# Patient Record
Sex: MALE | Race: WHITE | NOT HISPANIC OR LATINO | Employment: OTHER | URBAN - METROPOLITAN AREA
[De-identification: names, ages, dates, MRNs, and addresses within clinical notes are randomized per-mention and may not be internally consistent; named-entity substitution may affect disease eponyms.]

---

## 2017-03-27 PROBLEM — H66.92 LOM (LEFT OTITIS MEDIA): Status: ACTIVE | Noted: 2017-03-27

## 2017-03-27 PROBLEM — I10 HYPERTENSION: Status: ACTIVE | Noted: 2017-03-27

## 2017-03-27 PROBLEM — G31.84 MINIMAL COGNITIVE IMPAIRMENT: Status: ACTIVE | Noted: 2017-03-27

## 2017-07-25 ENCOUNTER — APPOINTMENT (OUTPATIENT)
Dept: GENERAL RADIOLOGY | Facility: HOSPITAL | Age: 67
End: 2017-07-25

## 2017-07-25 ENCOUNTER — HOSPITAL ENCOUNTER (OUTPATIENT)
Facility: HOSPITAL | Age: 67
Setting detail: OBSERVATION
Discharge: HOME | End: 2017-07-27
Attending: EMERGENCY MEDICINE | Admitting: HOSPITALIST

## 2017-07-25 ENCOUNTER — APPOINTMENT (OUTPATIENT)
Dept: CT IMAGING | Facility: HOSPITAL | Age: 67
End: 2017-07-25

## 2017-07-25 DIAGNOSIS — I10 ESSENTIAL HYPERTENSION: ICD-10-CM

## 2017-07-25 DIAGNOSIS — F02.80 ALZHEIMER'S DEMENTIA WITHOUT BEHAVIORAL DISTURBANCE, UNSPECIFIED TIMING OF DEMENTIA ONSET: ICD-10-CM

## 2017-07-25 DIAGNOSIS — I20.8 ANGINA AT REST (HCC): Primary | ICD-10-CM

## 2017-07-25 DIAGNOSIS — G30.9 ALZHEIMER'S DEMENTIA WITHOUT BEHAVIORAL DISTURBANCE, UNSPECIFIED TIMING OF DEMENTIA ONSET: ICD-10-CM

## 2017-07-25 PROBLEM — R07.9 CHEST PAIN: Status: ACTIVE | Noted: 2017-07-25

## 2017-07-25 PROBLEM — E78.5 HYPERLIPIDEMIA: Status: ACTIVE | Noted: 2017-07-25

## 2017-07-25 PROBLEM — R10.9 ABDOMINAL PAIN: Status: ACTIVE | Noted: 2017-07-25

## 2017-07-25 PROBLEM — Z85.46 HISTORY OF PROSTATE CANCER: Status: ACTIVE | Noted: 2017-07-25

## 2017-07-25 LAB
ALBUMIN SERPL-MCNC: 4.6 G/DL (ref 3.2–4.8)
ALBUMIN/GLOB SERPL: 1.4 G/DL (ref 1.5–2.5)
ALP SERPL-CCNC: 135 U/L (ref 25–100)
ALT SERPL W P-5'-P-CCNC: 30 U/L (ref 7–40)
ANION GAP SERPL CALCULATED.3IONS-SCNC: 6 MMOL/L (ref 3–11)
AST SERPL-CCNC: 25 U/L (ref 0–33)
BASOPHILS # BLD AUTO: 0.01 10*3/MM3 (ref 0–0.2)
BASOPHILS NFR BLD AUTO: 0.1 % (ref 0–1)
BILIRUB SERPL-MCNC: 0.5 MG/DL (ref 0.3–1.2)
BNP SERPL-MCNC: 8 PG/ML (ref 0–100)
BUN BLD-MCNC: 25 MG/DL (ref 9–23)
BUN/CREAT SERPL: 19.2 (ref 7–25)
CALCIUM SPEC-SCNC: 9.7 MG/DL (ref 8.7–10.4)
CHLORIDE SERPL-SCNC: 104 MMOL/L (ref 99–109)
CO2 SERPL-SCNC: 28 MMOL/L (ref 20–31)
CREAT BLD-MCNC: 1.3 MG/DL (ref 0.6–1.3)
DEPRECATED RDW RBC AUTO: 47.1 FL (ref 37–54)
EOSINOPHIL # BLD AUTO: 0.2 10*3/MM3 (ref 0–0.3)
EOSINOPHIL NFR BLD AUTO: 2.1 % (ref 0–3)
ERYTHROCYTE [DISTWIDTH] IN BLOOD BY AUTOMATED COUNT: 13.2 % (ref 11.3–14.5)
GFR SERPL CREATININE-BSD FRML MDRD: 55 ML/MIN/1.73
GLOBULIN UR ELPH-MCNC: 3.3 GM/DL
GLUCOSE BLD-MCNC: 110 MG/DL (ref 70–100)
HCT VFR BLD AUTO: 42.6 % (ref 38.9–50.9)
HGB BLD-MCNC: 14.2 G/DL (ref 13.1–17.5)
HOLD SPECIMEN: NORMAL
HOLD SPECIMEN: NORMAL
IMM GRANULOCYTES # BLD: 0.09 10*3/MM3 (ref 0–0.03)
IMM GRANULOCYTES NFR BLD: 1 % (ref 0–0.6)
LIPASE SERPL-CCNC: 120 U/L (ref 6–51)
LYMPHOCYTES # BLD AUTO: 2.55 10*3/MM3 (ref 0.6–4.8)
LYMPHOCYTES NFR BLD AUTO: 27.4 % (ref 24–44)
MCH RBC QN AUTO: 32.6 PG (ref 27–31)
MCHC RBC AUTO-ENTMCNC: 33.3 G/DL (ref 32–36)
MCV RBC AUTO: 97.7 FL (ref 80–99)
MONOCYTES # BLD AUTO: 0.96 10*3/MM3 (ref 0–1)
MONOCYTES NFR BLD AUTO: 10.3 % (ref 0–12)
NEUTROPHILS # BLD AUTO: 5.51 10*3/MM3 (ref 1.5–8.3)
NEUTROPHILS NFR BLD AUTO: 59.1 % (ref 41–71)
PLATELET # BLD AUTO: 227 10*3/MM3 (ref 150–450)
PMV BLD AUTO: 9.6 FL (ref 6–12)
POTASSIUM BLD-SCNC: 4.3 MMOL/L (ref 3.5–5.5)
PROT SERPL-MCNC: 7.9 G/DL (ref 5.7–8.2)
RBC # BLD AUTO: 4.36 10*6/MM3 (ref 4.2–5.76)
SODIUM BLD-SCNC: 138 MMOL/L (ref 132–146)
TROPONIN I SERPL-MCNC: 0 NG/ML (ref 0–0.07)
WBC NRBC COR # BLD: 9.32 10*3/MM3 (ref 3.5–10.8)
WHOLE BLOOD HOLD SPECIMEN: NORMAL
WHOLE BLOOD HOLD SPECIMEN: NORMAL

## 2017-07-25 PROCEDURE — 84484 ASSAY OF TROPONIN QUANT: CPT

## 2017-07-25 PROCEDURE — 99220 PR INITIAL OBSERVATION CARE/DAY 70 MINUTES: CPT | Performed by: NURSE PRACTITIONER

## 2017-07-25 PROCEDURE — 93005 ELECTROCARDIOGRAM TRACING: CPT

## 2017-07-25 PROCEDURE — 83690 ASSAY OF LIPASE: CPT | Performed by: EMERGENCY MEDICINE

## 2017-07-25 PROCEDURE — 74176 CT ABD & PELVIS W/O CONTRAST: CPT

## 2017-07-25 PROCEDURE — 85025 COMPLETE CBC W/AUTO DIFF WBC: CPT | Performed by: EMERGENCY MEDICINE

## 2017-07-25 PROCEDURE — 83880 ASSAY OF NATRIURETIC PEPTIDE: CPT | Performed by: EMERGENCY MEDICINE

## 2017-07-25 PROCEDURE — 71010 HC CHEST PA OR AP: CPT

## 2017-07-25 PROCEDURE — 80053 COMPREHEN METABOLIC PANEL: CPT | Performed by: EMERGENCY MEDICINE

## 2017-07-25 PROCEDURE — 99284 EMERGENCY DEPT VISIT MOD MDM: CPT

## 2017-07-25 PROCEDURE — 93005 ELECTROCARDIOGRAM TRACING: CPT | Performed by: EMERGENCY MEDICINE

## 2017-07-25 RX ORDER — SODIUM CHLORIDE 0.9 % (FLUSH) 0.9 %
10 SYRINGE (ML) INJECTION AS NEEDED
Status: DISCONTINUED | OUTPATIENT
Start: 2017-07-25 | End: 2017-07-27 | Stop reason: HOSPADM

## 2017-07-25 RX ORDER — MEMANTINE HYDROCHLORIDE 10 MG/1
10 TABLET ORAL 2 TIMES DAILY
COMMUNITY

## 2017-07-25 RX ORDER — ASPIRIN 81 MG/1
81 TABLET ORAL DAILY
COMMUNITY

## 2017-07-25 RX ORDER — LISINOPRIL 40 MG/1
40 TABLET ORAL DAILY
COMMUNITY
End: 2018-02-20

## 2017-07-25 RX ORDER — AMLODIPINE BESYLATE 5 MG/1
5 TABLET ORAL DAILY
COMMUNITY
End: 2018-02-20

## 2017-07-25 RX ORDER — ASPIRIN 81 MG/1
324 TABLET, CHEWABLE ORAL ONCE
Status: COMPLETED | OUTPATIENT
Start: 2017-07-25 | End: 2017-07-25

## 2017-07-25 RX ADMIN — ASPIRIN 81 MG CHEWABLE TABLET 243 MG: 81 TABLET CHEWABLE at 22:00

## 2017-07-25 RX ADMIN — NITROGLYCERIN 0.5 INCH: 20 OINTMENT TOPICAL at 23:24

## 2017-07-26 ENCOUNTER — APPOINTMENT (OUTPATIENT)
Dept: CT IMAGING | Facility: HOSPITAL | Age: 67
End: 2017-07-26

## 2017-07-26 ENCOUNTER — APPOINTMENT (OUTPATIENT)
Dept: ULTRASOUND IMAGING | Facility: HOSPITAL | Age: 67
End: 2017-07-26

## 2017-07-26 ENCOUNTER — APPOINTMENT (OUTPATIENT)
Dept: CARDIOLOGY | Facility: HOSPITAL | Age: 67
End: 2017-07-26

## 2017-07-26 PROBLEM — I20.8 ANGINA AT REST (HCC): Status: ACTIVE | Noted: 2017-07-26

## 2017-07-26 PROBLEM — N28.9 MILD RENAL INSUFFICIENCY: Status: ACTIVE | Noted: 2017-07-26

## 2017-07-26 PROBLEM — R41.3 AMNESIA: Status: ACTIVE | Noted: 2017-07-26

## 2017-07-26 PROBLEM — R91.1 LUNG NODULE: Status: ACTIVE | Noted: 2017-07-26

## 2017-07-26 LAB
AMYLASE SERPL-CCNC: 225 U/L (ref 30–118)
ANION GAP SERPL CALCULATED.3IONS-SCNC: 7 MMOL/L (ref 3–11)
ARTICHOKE IGE QN: 65 MG/DL (ref 0–130)
BH CV STRESS BP STAGE 1: NORMAL
BH CV STRESS BP STAGE 2: NORMAL
BH CV STRESS BP STAGE 4: NORMAL
BH CV STRESS COMMENTS STAGE 1: NORMAL
BH CV STRESS DOSE REGADENOSON STAGE 1: 0.4
BH CV STRESS DURATION MIN STAGE 1: 0
BH CV STRESS DURATION MIN STAGE 2: 1
BH CV STRESS DURATION MIN STAGE 3: 1
BH CV STRESS DURATION SEC STAGE 1: 15
BH CV STRESS DURATION SEC STAGE 2: 0
BH CV STRESS HR STAGE 1: 72
BH CV STRESS HR STAGE 2: 78
BH CV STRESS HR STAGE 3: 71
BH CV STRESS HR STAGE 4: 65
BH CV STRESS PROTOCOL 1: NORMAL
BH CV STRESS RECOVERY BP: NORMAL MMHG
BH CV STRESS RECOVERY HR: 66 BPM
BH CV STRESS STAGE 1: 1
BH CV STRESS STAGE 2: 2
BH CV STRESS STAGE 3: 3
BH CV STRESS STAGE 4: 4
BUN BLD-MCNC: 26 MG/DL (ref 9–23)
BUN/CREAT SERPL: 20 (ref 7–25)
CA-I SERPL ISE-MCNC: 1.18 MMOL/L (ref 1.12–1.32)
CALCIUM SPEC-SCNC: 9.2 MG/DL (ref 8.7–10.4)
CHLORIDE SERPL-SCNC: 102 MMOL/L (ref 99–109)
CHOLEST SERPL-MCNC: 114 MG/DL (ref 0–200)
CO2 SERPL-SCNC: 27 MMOL/L (ref 20–31)
CREAT BLD-MCNC: 1.3 MG/DL (ref 0.6–1.3)
DEPRECATED RDW RBC AUTO: 48.3 FL (ref 37–54)
ERYTHROCYTE [DISTWIDTH] IN BLOOD BY AUTOMATED COUNT: 13.4 % (ref 11.3–14.5)
GFR SERPL CREATININE-BSD FRML MDRD: 55 ML/MIN/1.73
GLUCOSE BLD-MCNC: 89 MG/DL (ref 70–100)
HBA1C MFR BLD: 5.6 % (ref 4.8–5.6)
HCT VFR BLD AUTO: 40.1 % (ref 38.9–50.9)
HDLC SERPL-MCNC: 30 MG/DL (ref 40–60)
HGB BLD-MCNC: 13.2 G/DL (ref 13.1–17.5)
LIPASE SERPL-CCNC: 380 U/L (ref 6–51)
LV EF NUC BP: 72 %
MAGNESIUM SERPL-MCNC: 2.1 MG/DL (ref 1.3–2.7)
MAXIMAL PREDICTED HEART RATE: 151 BPM
MCH RBC QN AUTO: 32.5 PG (ref 27–31)
MCHC RBC AUTO-ENTMCNC: 32.9 G/DL (ref 32–36)
MCV RBC AUTO: 98.8 FL (ref 80–99)
PERCENT MAX PREDICTED HR: 54.3 %
PLATELET # BLD AUTO: 213 10*3/MM3 (ref 150–450)
PMV BLD AUTO: 10.4 FL (ref 6–12)
POTASSIUM BLD-SCNC: 4.4 MMOL/L (ref 3.5–5.5)
RBC # BLD AUTO: 4.06 10*6/MM3 (ref 4.2–5.76)
SODIUM BLD-SCNC: 136 MMOL/L (ref 132–146)
STRESS BASELINE BP: NORMAL MMHG
STRESS BASELINE HR: 51 BPM
STRESS PERCENT HR: 64 %
STRESS POST PEAK BP: NORMAL MMHG
STRESS POST PEAK HR: 82 BPM
STRESS TARGET HR: 128 BPM
TRIGL SERPL-MCNC: 148 MG/DL (ref 0–150)
TROPONIN I SERPL-MCNC: 0 NG/ML (ref 0–0.07)
TROPONIN I SERPL-MCNC: <0.006 NG/ML
TSH SERPL DL<=0.05 MIU/L-ACNC: 2.24 MIU/ML (ref 0.35–5.35)
WBC NRBC COR # BLD: 7.23 10*3/MM3 (ref 3.5–10.8)

## 2017-07-26 PROCEDURE — 71260 CT THORAX DX C+: CPT

## 2017-07-26 PROCEDURE — G0378 HOSPITAL OBSERVATION PER HR: HCPCS

## 2017-07-26 PROCEDURE — 25010000002 REGADENOSON 0.4 MG/5ML SOLUTION: Performed by: HOSPITALIST

## 2017-07-26 PROCEDURE — 93017 CV STRESS TEST TRACING ONLY: CPT

## 2017-07-26 PROCEDURE — 80061 LIPID PANEL: CPT | Performed by: HOSPITALIST

## 2017-07-26 PROCEDURE — 84484 ASSAY OF TROPONIN QUANT: CPT

## 2017-07-26 PROCEDURE — 76705 ECHO EXAM OF ABDOMEN: CPT

## 2017-07-26 PROCEDURE — 84484 ASSAY OF TROPONIN QUANT: CPT | Performed by: NURSE PRACTITIONER

## 2017-07-26 PROCEDURE — 80048 BASIC METABOLIC PNL TOTAL CA: CPT | Performed by: NURSE PRACTITIONER

## 2017-07-26 PROCEDURE — 93005 ELECTROCARDIOGRAM TRACING: CPT | Performed by: NURSE PRACTITIONER

## 2017-07-26 PROCEDURE — 85027 COMPLETE CBC AUTOMATED: CPT | Performed by: NURSE PRACTITIONER

## 2017-07-26 PROCEDURE — 83735 ASSAY OF MAGNESIUM: CPT | Performed by: NURSE PRACTITIONER

## 2017-07-26 PROCEDURE — A9555 RB82 RUBIDIUM: HCPCS | Performed by: HOSPITALIST

## 2017-07-26 PROCEDURE — 82150 ASSAY OF AMYLASE: CPT | Performed by: NURSE PRACTITIONER

## 2017-07-26 PROCEDURE — 83036 HEMOGLOBIN GLYCOSYLATED A1C: CPT | Performed by: NURSE PRACTITIONER

## 2017-07-26 PROCEDURE — 83690 ASSAY OF LIPASE: CPT | Performed by: NURSE PRACTITIONER

## 2017-07-26 PROCEDURE — 99226 PR SBSQ OBSERVATION CARE/DAY 35 MINUTES: CPT | Performed by: HOSPITALIST

## 2017-07-26 PROCEDURE — 93018 CV STRESS TEST I&R ONLY: CPT | Performed by: INTERNAL MEDICINE

## 2017-07-26 PROCEDURE — 84443 ASSAY THYROID STIM HORMONE: CPT | Performed by: NURSE PRACTITIONER

## 2017-07-26 PROCEDURE — 78492 MYOCRD IMG PET MLT RST&STRS: CPT

## 2017-07-26 PROCEDURE — 96372 THER/PROPH/DIAG INJ SC/IM: CPT

## 2017-07-26 PROCEDURE — 93010 ELECTROCARDIOGRAM REPORT: CPT | Performed by: INTERNAL MEDICINE

## 2017-07-26 PROCEDURE — 25010000002 HEPARIN (PORCINE) PER 1000 UNITS: Performed by: NURSE PRACTITIONER

## 2017-07-26 PROCEDURE — 78492 MYOCRD IMG PET MLT RST&STRS: CPT | Performed by: INTERNAL MEDICINE

## 2017-07-26 PROCEDURE — 0 IOPAMIDOL 61 % SOLUTION: Performed by: HOSPITALIST

## 2017-07-26 PROCEDURE — 0 RUBIDIUM CHLORIDE: Performed by: HOSPITALIST

## 2017-07-26 PROCEDURE — 82330 ASSAY OF CALCIUM: CPT | Performed by: NURSE PRACTITIONER

## 2017-07-26 RX ORDER — CITALOPRAM 20 MG/1
20 TABLET ORAL DAILY
Status: DISCONTINUED | OUTPATIENT
Start: 2017-07-26 | End: 2017-07-27 | Stop reason: HOSPADM

## 2017-07-26 RX ORDER — SODIUM CHLORIDE 0.9 % (FLUSH) 0.9 %
1-10 SYRINGE (ML) INJECTION AS NEEDED
Status: DISCONTINUED | OUTPATIENT
Start: 2017-07-26 | End: 2017-07-27 | Stop reason: HOSPADM

## 2017-07-26 RX ORDER — ACETAMINOPHEN 325 MG/1
650 TABLET ORAL EVERY 4 HOURS PRN
Status: DISCONTINUED | OUTPATIENT
Start: 2017-07-26 | End: 2017-07-27 | Stop reason: HOSPADM

## 2017-07-26 RX ORDER — LISINOPRIL 40 MG/1
40 TABLET ORAL DAILY
Status: DISCONTINUED | OUTPATIENT
Start: 2017-07-26 | End: 2017-07-27 | Stop reason: HOSPADM

## 2017-07-26 RX ORDER — HEPARIN SODIUM 5000 [USP'U]/ML
5000 INJECTION, SOLUTION INTRAVENOUS; SUBCUTANEOUS EVERY 12 HOURS SCHEDULED
Status: DISCONTINUED | OUTPATIENT
Start: 2017-07-26 | End: 2017-07-27 | Stop reason: HOSPADM

## 2017-07-26 RX ORDER — ASPIRIN 81 MG/1
81 TABLET ORAL DAILY
Status: DISCONTINUED | OUTPATIENT
Start: 2017-07-26 | End: 2017-07-27 | Stop reason: HOSPADM

## 2017-07-26 RX ORDER — DONEPEZIL HYDROCHLORIDE 10 MG/1
10 TABLET, FILM COATED ORAL NIGHTLY
Status: DISCONTINUED | OUTPATIENT
Start: 2017-07-26 | End: 2017-07-27 | Stop reason: HOSPADM

## 2017-07-26 RX ORDER — SODIUM CHLORIDE 9 MG/ML
125 INJECTION, SOLUTION INTRAVENOUS CONTINUOUS
Status: DISCONTINUED | OUTPATIENT
Start: 2017-07-26 | End: 2017-07-27 | Stop reason: HOSPADM

## 2017-07-26 RX ORDER — AMLODIPINE BESYLATE 5 MG/1
5 TABLET ORAL DAILY
Status: DISCONTINUED | OUTPATIENT
Start: 2017-07-26 | End: 2017-07-27 | Stop reason: HOSPADM

## 2017-07-26 RX ORDER — ATORVASTATIN CALCIUM 40 MG/1
80 TABLET, FILM COATED ORAL NIGHTLY
Status: DISCONTINUED | OUTPATIENT
Start: 2017-07-27 | End: 2017-07-27 | Stop reason: HOSPADM

## 2017-07-26 RX ORDER — MEMANTINE HYDROCHLORIDE 10 MG/1
10 TABLET ORAL 2 TIMES DAILY
Status: DISCONTINUED | OUTPATIENT
Start: 2017-07-26 | End: 2017-07-27 | Stop reason: HOSPADM

## 2017-07-26 RX ADMIN — RUBIDIUM CHLORIDE RB-82 1 DOSE: 150 INJECTION, SOLUTION INTRAVENOUS at 13:15

## 2017-07-26 RX ADMIN — SODIUM CHLORIDE 125 ML/HR: 9 INJECTION, SOLUTION INTRAVENOUS at 11:30

## 2017-07-26 RX ADMIN — ASPIRIN 81 MG: 81 TABLET, COATED ORAL at 08:01

## 2017-07-26 RX ADMIN — IOPAMIDOL 80 ML: 612 INJECTION, SOLUTION INTRAVENOUS at 14:39

## 2017-07-26 RX ADMIN — MEMANTINE 10 MG: 10 TABLET ORAL at 08:01

## 2017-07-26 RX ADMIN — MEMANTINE 10 MG: 10 TABLET ORAL at 17:16

## 2017-07-26 RX ADMIN — REGADENOSON 0.4 MG: 0.08 INJECTION, SOLUTION INTRAVENOUS at 13:14

## 2017-07-26 RX ADMIN — HEPARIN SODIUM 5000 UNITS: 5000 INJECTION, SOLUTION INTRAVENOUS; SUBCUTANEOUS at 08:01

## 2017-07-26 RX ADMIN — LISINOPRIL 40 MG: 40 TABLET ORAL at 08:01

## 2017-07-26 RX ADMIN — RUBIDIUM CHLORIDE RB-82 1 DOSE: 150 INJECTION, SOLUTION INTRAVENOUS at 13:04

## 2017-07-26 RX ADMIN — CITALOPRAM HYDROBROMIDE 20 MG: 20 TABLET ORAL at 08:01

## 2017-07-26 RX ADMIN — HEPARIN SODIUM 5000 UNITS: 5000 INJECTION, SOLUTION INTRAVENOUS; SUBCUTANEOUS at 20:30

## 2017-07-26 RX ADMIN — AMLODIPINE BESYLATE 5 MG: 5 TABLET ORAL at 08:02

## 2017-07-26 RX ADMIN — DONEPEZIL HYDROCHLORIDE 10 MG: 10 TABLET, FILM COATED ORAL at 20:30

## 2017-07-26 NOTE — ED PROVIDER NOTES
Subjective   HPI Comments: Mr. Mauricio Barber is a 67 y.o. male who has Alzheimer's and presents to the ED with c/o CP. His wife reports that they had been out running errands all day and when they got home around 1630 he began feeling lightheaded and asked if they'd eaten yet today. However, she notes that they had already eaten breakfast and lunch earlier in the day. A few hours later he began complaining of a pressure in his head and at 1930 he started complaining of a tightness in his chest. He also complains of bilateral arm tingling, and a runny nose. He denies SoA, sweating, nausea, and a sore throat. His wife states that he had a similar episode 3 months ago when he had a syncopal episode and was seen at [facility] and diagnosed with dehydration. He then wore a holter monitor that was negative and saw [facility]. He has no hx of a stress test. He has a hx of HTN, HLD, and Alzheimer's. He has a family hx of heart disease. No other acute complaints at this time.    Patient is a 67 y.o. male presenting with chest pain.   History provided by:  Patient and spouse  Chest Pain   Pain quality: tightness    Pain severity:  Moderate  Onset quality:  Gradual  Duration:  3 hours  Timing:  Constant  Progression:  Improving  Chronicity:  New  Associated symptoms: headache    Associated symptoms: no diaphoresis, no nausea and no shortness of breath        Review of Systems   Constitutional: Negative for diaphoresis.   HENT: Positive for rhinorrhea. Negative for sore throat.    Respiratory: Negative for shortness of breath.    Cardiovascular: Positive for chest pain.   Gastrointestinal: Negative for nausea.   Neurological: Positive for headaches.   All other systems reviewed and are negative.      Past Medical History:   Diagnosis Date   • Alzheimer disease    • Cancer    • Depression    • High cholesterol    • Hyperlipidemia    • Hypertension    • Prostate cancer        Allergies   Allergen Reactions   • No  Known Drug Allergy        Past Surgical History:   Procedure Laterality Date   • PROSTATE SURGERY     • PROSTATE SURGERY         Family History   Problem Relation Age of Onset   • Cancer Mother    • Heart disease Father    • No Known Problems Brother    • Hypothyroidism Daughter    • No Known Problems Sister    • Cancer Brother      Prostate       Social History     Social History   • Marital status:      Spouse name: N/A   • Number of children: N/A   • Years of education: N/A     Social History Main Topics   • Smoking status: Former Smoker   • Smokeless tobacco: None   • Alcohol use No   • Drug use: No   • Sexual activity: Yes     Partners: Female     Other Topics Concern   • None     Social History Narrative   • None         Objective   Physical Exam   Constitutional: He is oriented to person, place, and time. He appears well-developed. No distress.   HENT:   Head: Normocephalic and atraumatic.   Nose: Nose normal.   Pharynx benign. Airway patent.   Eyes: Conjunctivae are normal. No scleral icterus.   Neck: Phonation normal. Neck supple. No JVD present. No thyromegaly present.   Cardiovascular: Normal rate and regular rhythm.    Pulmonary/Chest: Effort normal. No respiratory distress. He exhibits no tenderness.   Abdominal: Soft. There is no tenderness.   Musculoskeletal: Normal range of motion. He exhibits no edema (No pretibular edema).   Lymphadenopathy:     He has no cervical adenopathy.   Neurological: He is alert and oriented to person, place, and time.   Skin: Skin is warm and dry.   Psychiatric: He has a normal mood and affect. His behavior is normal.   Nursing note and vitals reviewed.      Procedures         ED Course  ED Course   Comment By Time   He has remained comfortable here.  HEART score = 5.  Admitted as moderate risk for MACE. Abdullahi Holland MD 07/25 8598     Recent Results (from the past 24 hour(s))   Comprehensive Metabolic Panel    Collection Time: 07/25/17  9:51 PM   Result Value Ref  Range    Glucose 110 (H) 70 - 100 mg/dL    BUN 25 (H) 9 - 23 mg/dL    Creatinine 1.30 0.60 - 1.30 mg/dL    Sodium 138 132 - 146 mmol/L    Potassium 4.3 3.5 - 5.5 mmol/L    Chloride 104 99 - 109 mmol/L    CO2 28.0 20.0 - 31.0 mmol/L    Calcium 9.7 8.7 - 10.4 mg/dL    Total Protein 7.9 5.7 - 8.2 g/dL    Albumin 4.60 3.20 - 4.80 g/dL    ALT (SGPT) 30 7 - 40 U/L    AST (SGOT) 25 0 - 33 U/L    Alkaline Phosphatase 135 (H) 25 - 100 U/L    Total Bilirubin 0.5 0.3 - 1.2 mg/dL    eGFR Non African Amer 55 (L) >60 mL/min/1.73    Globulin 3.3 gm/dL    A/G Ratio 1.4 (L) 1.5 - 2.5 g/dL    BUN/Creatinine Ratio 19.2 7.0 - 25.0    Anion Gap 6.0 3.0 - 11.0 mmol/L   Lipase    Collection Time: 07/25/17  9:51 PM   Result Value Ref Range    Lipase 120 (H) 6 - 51 U/L   BNP    Collection Time: 07/25/17  9:51 PM   Result Value Ref Range    BNP 8.0 0.0 - 100.0 pg/mL   Light Blue Top    Collection Time: 07/25/17  9:51 PM   Result Value Ref Range    Extra Tube hold for add-on    Green Top (Gel)    Collection Time: 07/25/17  9:51 PM   Result Value Ref Range    Extra Tube Hold for add-ons.    Lavender Top    Collection Time: 07/25/17  9:51 PM   Result Value Ref Range    Extra Tube hold for add-on    Gold Top - SST    Collection Time: 07/25/17  9:51 PM   Result Value Ref Range    Extra Tube Hold for add-ons.    CBC Auto Differential    Collection Time: 07/25/17  9:51 PM   Result Value Ref Range    WBC 9.32 3.50 - 10.80 10*3/mm3    RBC 4.36 4.20 - 5.76 10*6/mm3    Hemoglobin 14.2 13.1 - 17.5 g/dL    Hematocrit 42.6 38.9 - 50.9 %    MCV 97.7 80.0 - 99.0 fL    MCH 32.6 (H) 27.0 - 31.0 pg    MCHC 33.3 32.0 - 36.0 g/dL    RDW 13.2 11.3 - 14.5 %    RDW-SD 47.1 37.0 - 54.0 fl    MPV 9.6 6.0 - 12.0 fL    Platelets 227 150 - 450 10*3/mm3    Neutrophil % 59.1 41.0 - 71.0 %    Lymphocyte % 27.4 24.0 - 44.0 %    Monocyte % 10.3 0.0 - 12.0 %    Eosinophil % 2.1 0.0 - 3.0 %    Basophil % 0.1 0.0 - 1.0 %    Immature Grans % 1.0 (H) 0.0 - 0.6 %    Neutrophils,  Absolute 5.51 1.50 - 8.30 10*3/mm3    Lymphocytes, Absolute 2.55 0.60 - 4.80 10*3/mm3    Monocytes, Absolute 0.96 0.00 - 1.00 10*3/mm3    Eosinophils, Absolute 0.20 0.00 - 0.30 10*3/mm3    Basophils, Absolute 0.01 0.00 - 0.20 10*3/mm3    Immature Grans, Absolute 0.09 (H) 0.00 - 0.03 10*3/mm3   POC Troponin, Rapid    Collection Time: 07/25/17  9:58 PM   Result Value Ref Range    Troponin I 0.00 0.00 - 0.07 ng/mL   POC Troponin, Rapid    Collection Time: 07/26/17 12:20 AM   Result Value Ref Range    Troponin I 0.00 0.00 - 0.07 ng/mL     Note: In addition to lab results from this visit, the labs listed above may include labs taken at another facility or during a different encounter within the last 24 hours. Please correlate lab times with ED admission and discharge times for further clarification of the services performed during this visit.    CT Abdomen Pelvis Without Contrast   Final Result   Abnormal   1.  No acute findings.   2.  5 mm right lower lobe nodule.               Fleischner Society guidelines for management of small pulmonary nodules    incidentally detected on CT:.      Nodule >4-6 mm:      Low risk: Follow-up CT at 12 months; if unchanged, no further follow-up.      High risk: Initial follow-up CT at 6-12 months, then at 18-24 months if no    change.      THIS DOCUMENT HAS BEEN ELECTRONICALLY SIGNED BY CHEVY CHO MD      XR Chest 1 View   Final Result   Abnormal   1.  No acute findings.     2.  Nodular density projecting over the right hilum may represent vessels on    end versus possible lymph node or nodule.  Comparison with previous imaging or    followup PA and lateral radiographs recommended.      THIS DOCUMENT HAS BEEN ELECTRONICALLY SIGNED BY CHEVY CHO MD        Vitals:    07/25/17 2230 07/25/17 2300 07/25/17 2330 07/26/17 0120   BP: 145/78 143/73 144/78    BP Location:       Patient Position:       Pulse: 54 55 52 59   Resp:    16   Temp:    97.9 °F (36.6 °C)   TempSrc:    Oral   SpO2:  "98% 96% 96% 95%   Weight:    201 lb 9.6 oz (91.4 kg)   Height:    72\" (182.9 cm)     Medications   sodium chloride 0.9 % flush 10 mL (not administered)   aspirin EC tablet 81 mg (not administered)   citalopram (CeleXA) tablet 20 mg (not administered)   donepezil (ARICEPT) tablet 10 mg (not administered)   memantine (NAMENDA) tablet 10 mg (not administered)   amLODIPine (NORVASC) tablet 5 mg (not administered)   lisinopril (PRINIVIL,ZESTRIL) tablet 40 mg (not administered)   sodium chloride 0.9 % flush 1-10 mL (not administered)   heparin (porcine) 5000 UNIT/ML injection 5,000 Units (not administered)   atorvastatin (LIPITOR) tablet 80 mg (not administered)   nitroglycerin (NITROSTAT) ointment 0.5 inch (not administered)   acetaminophen (TYLENOL) tablet 650 mg (not administered)   aspirin chewable tablet 324 mg (243 mg Oral Given 7/25/17 2200)   nitroglycerin (NITROSTAT) ointment 0.5 inch (0.5 inches Topical Given 7/25/17 2324)     ECG/EMG Results (last 24 hours)     Procedure Component Value Units Date/Time    ECG 12 Lead [[id number]] Collected:  07/25/17 2149     Updated:  07/25/17 2203    Narrative:       Test Reason : cp  Blood Pressure : **/** mmHG  Vent. Rate : 063 BPM     Atrial Rate : 063 BPM     P-R Int : 158 ms          QRS Dur : 078 ms      QT Int : 398 ms       P-R-T Axes : 036 019 035 degrees     QTc Int : 407 ms    Normal sinus rhythm  No previous ECGs available  Confirmed by HALLE CLAYTON MD ([id number]) on 7/25/2017 10:03:31 PM    Referred By:  EDWR           Confirmed By:HALLE CLAYTON MD                HEART Score  History: Moderately suspicious (+1)  ECG: Normal (+0)  Age: Greater than or equal to 65 (+2)  Risk Factors: 3 or more risk factors OR history of atherosclerotic disease (+2)  Troponin: Normal limit or lower (+0)  Total: 5             MDM    Final diagnoses:   Angina at rest   Alzheimer's dementia without behavioral disturbance, unspecified timing of dementia onset   Essential hypertension "       Documentation assistance provided by adelfo Ag.  Information recorded by the joseibe was done at my direction and has been verified and validated by me.     Taylor Ag  07/25/17 6228       Taylor Ag  07/25/17 6590       Abdullahi Holland MD  07/26/17 8185

## 2017-07-26 NOTE — PROGRESS NOTES
[facility] Medicine Services  INPATIENT PROGRESS NOTE    Date of Admission: 7/25/2017  Length of Stay: 0  Primary Care Physician: Thony Jamison MD    Subjective   CC: f/u chest pain   HPI:  Pt resting comfortably in bed when seen, reporting feeling hungry but no other complaints including no chest pain, dyspnea, palpitations, abdominal pain, nausea, vomiting, or diaphoresis. Pt NPO, waiting for stress test. No active or recent alcohol use. No prior hx of pancreatitis. Wife at bedside. Pt's dementia makes him a poor  of his HPI and ROS.     Review Of Systems:   Review of Systems   Constitutional: Negative for chills and fever.   Respiratory: Negative for cough and shortness of breath.    Cardiovascular: Negative for chest pain and palpitations.   Gastrointestinal: Negative for abdominal pain, nausea and vomiting.   Genitourinary: Negative for difficulty urinating.     Objective      Temp:  [97.9 °F (36.6 °C)-98.4 °F (36.9 °C)] 98 °F (36.7 °C)  Heart Rate:  [48-64] 48  Resp:  [16-20] 20  BP: (119-174)/(62-82) 119/62  Physical Exam  Constitutional: no acute distress, awake, alert  Respiratory: Clear to auscultation bilaterally, nonlabored respirations   Cardiovascular: RRR, no murmurs, rubs, or gallops, palpable pedal pulses bilaterally  Gastrointestinal: Positive bowel sounds, soft, nontender, nondistended, obese   Musculoskeletal: No bilateral ankle edema  Psychiatric: oriented x 3, appropriate affect, cooperative  Neurologic: Strength symmetric in all extremities       Results Review:    I have reviewed the labs, radiology results and diagnostic studies.      Results from last 7 days  Lab Units 07/26/17  0736   WBC 10*3/mm3 7.23   HEMOGLOBIN g/dL 13.2   PLATELETS 10*3/mm3 213       Results from last 7 days  Lab Units 07/26/17  0736   SODIUM mmol/L 136   POTASSIUM mmol/L 4.4   CHLORIDE mmol/L 102   CO2 mmol/L 27.0   BUN mg/dL 26*   CREATININE mg/dL 1.30   GLUCOSE mg/dL 89    CALCIUM mg/dL 9.2       Lab Results  Lab Value Date/Time   LIPASE 380 (H) 07/26/2017 0736   LIPASE 120 (H) 07/25/2017 2151       Troponins x 3 negative       Results from last 7 days  Lab Units 07/26/17  0736   HEMOGLOBIN A1C % 5.60           Culture Data: Cultures:         Radiology Data:   CT Abd/pelvis with no acute findings, 5mm RLL nodule. Personally reviewed, I agree with official interp of no acute abnormalities     ECG with sinus bradycardia, otherwise normal on my personal review/interp       I have reviewed the medications.    Assessment/Plan   Mr. Barber is a 67 year-old M with a past hx of alzheimer's dementia, HTN, HLP, depression, syncope, remote tobacco use (smoked multiple packs a day x 15 years, quit 35 years ago) and prostate cancer who presented to the [facility] ER on 7/25 with complaints of the sudden onset of lightheadedness, chest tightness, and bilateral arm tingling at 5PM while seated.  This is in the setting of uncontrolled HTN and some abdominal discomfort:     Problem List  Hospital Problem List     * (Principal)Chest pain    Minimal cognitive impairment    Hypertension    Hyperlipidemia    Abdominal pain    History of prostate cancer    Angina at rest    Lung nodule    Mild renal insufficiency        Assessment/Plan:  - NPO for stress test this AM to rule out unstable angina. Blood work and imaging have ruled out NSTEMI and aortic dissection, clinical suspicion for PE or Aortic Dissection is low in light of possible alternate diagnosis of mild pancreatitis   - Continue aspirin, atorvastatin  - In light of normal CT abd/pelvis, significance of elevated lipase is uncertain however it has increased and may be indicative of mild pancreatitis. Will give IVF, monitor response to diet, and check gallbladder US to eval for CBD dilation or gall stones  - F/u dedicated CT Chest, pt will need at least a repeat CT Chest in 6 months to follow up solitary pulmonary nodule   - Unknown baseline renal  function, Cr stable   - Recent holter monitor was negative   - If stress test and gallbladder us negative, and pt able to tolerate a diet, possible dc late today, otherwise tomorrow AM    DVT prophylaxis: SQ heparin  Discharge Planning: I expect patient to be discharged to home in 0-1 days.    Kumar Andrew MD   07/26/17   6:56 AM

## 2017-07-26 NOTE — H&P
[facility] Medicine Services  HISTORY AND PHYSICAL    Primary Care Physician: Thony Jamison MD    Subjective     Chief Complaint: Chest Pain    History of Present Illness:   Patient is a 67 year old male with Alzheimer's Disease, who presents to [facility] Emergency Department with complaints of chest pain. Patient is unable to give an accurate history, and his wife who is at bedside assists him. According to his wife, the patient has been complaining of non-specific abdominal pain through the day. Before dinner, the patient began to complain of headache, light-headedness, and tingling to both arms. Patient also states that he did get some mild chest pain that settled in his epigastric area. The wife reports the patient had a syncopal episode in May. He was taken to [facility] at that time and wore a holter monitor for 30 days. Tonight, while in the emergency department the patient has had negative troponin x1, no acute findings on his EKG, but has a lipase of 120. CT Abdomen/pelvis pending at time of assessment. Patient will be admitted to the hospital medicine service for further evaluation and treatment.     Review of Systems   Constitutional: Negative for chills, diaphoresis and fever.   Respiratory: Negative for cough, shortness of breath and wheezing.    Cardiovascular: Positive for chest pain. Negative for palpitations and leg swelling.   Gastrointestinal: Positive for abdominal distention and abdominal pain. Negative for nausea and vomiting.   Genitourinary: Negative for dysuria, frequency and urgency.   Musculoskeletal: Negative for arthralgias and myalgias.   Skin: Negative for color change, pallor, rash and wound.   Neurological: Positive for light-headedness and headaches. Negative for dizziness and weakness.   Psychiatric/Behavioral: Positive for confusion. Negative for agitation.   All other systems reviewed and are negative.     Otherwise complete  "ROS performed and negative except as mentioned in the HPI.    Past Medical History:   Diagnosis Date   • Alzheimer disease    • Cancer    • Depression    • High cholesterol    • Hyperlipidemia    • Hypertension    • Prostate cancer        Past Surgical History:   Procedure Laterality Date   • PROSTATE SURGERY     • PROSTATE SURGERY         Family History   Problem Relation Age of Onset   • Cancer Mother    • Heart disease Father    • No Known Problems Brother    • Hypothyroidism Daughter    • No Known Problems Sister    • Cancer Brother      Prostate       Social History     Social History   • Marital status:      Spouse name: N/A   • Number of children: N/A   • Years of education: N/A     Occupational History   • Not on file.     Social History Main Topics   • Smoking status: Former Smoker   • Smokeless tobacco: Not on file   • Alcohol use No   • Drug use: No   • Sexual activity: Yes     Partners: Female     Other Topics Concern   • Not on file     Social History Narrative   • No narrative on file       Medications:  Reviewed on Admission    Allergies:  No Known Allergies      Objective     Physical Exam:  Vital Signs: /78  Pulse 52  Temp 98.4 °F (36.9 °C) (Oral)   Resp 16  Ht 72\" (182.9 cm)  Wt 185 lb (83.9 kg)  SpO2 96%  BMI 25.09 kg/m2  Physical Exam   Constitutional: He appears well-developed and well-nourished. He is cooperative.   HENT:   Head: Normocephalic and atraumatic.   Eyes: EOM are normal. Pupils are equal, round, and reactive to light. No scleral icterus.   Neck: Normal range of motion. Neck supple. No JVD present.   Cardiovascular: Normal rate, regular rhythm, normal heart sounds and intact distal pulses.  Exam reveals no gallop and no friction rub.    No murmur heard.  Pulmonary/Chest: Effort normal and breath sounds normal. No respiratory distress. He has no wheezes. He has no rales. He exhibits no tenderness.   Abdominal: Soft. He exhibits distension. There is tenderness in " the right upper quadrant and epigastric area. There is no rebound and no guarding.   Musculoskeletal: Normal range of motion. He exhibits no edema, tenderness or deformity.   Neurological: He is alert. He has normal strength. No sensory deficit.   Skin: Skin is warm and dry. No rash noted. No erythema. No pallor.   Psychiatric: He has a normal mood and affect. His speech is normal and behavior is normal. Cognition and memory are impaired.   Nursing note and vitals reviewed.    Results Reviewed:    Results from last 7 days  Lab Units 07/25/17 2151   WBC 10*3/mm3 9.32   HEMOGLOBIN g/dL 14.2   PLATELETS 10*3/mm3 227       Results from last 7 days  Lab Units 07/25/17 2151   SODIUM mmol/L 138   POTASSIUM mmol/L 4.3   CO2 mmol/L 28.0   CREATININE mg/dL 1.30   GLUCOSE mg/dL 110*   CALCIUM mg/dL 9.7     Imaging Results (last 24 hours)     Procedure Component Value Units Date/Time    XR Chest 1 View [[id number]]  (Abnormal) Collected:  07/25/17 2151     Updated:  07/25/17 2304    Narrative:       EXAM:    XR Chest, 1 View    CLINICAL HISTORY:    67 years old, male; Pain; Chest pain; Type not specified; Additional info:   Chest pain triage protocol    TECHNIQUE:    Frontal view of the chest.    COMPARISON:    No relevant prior studies available.    FINDINGS:    Lungs: Small nodular density projecting over the right hilum. The lungs are   otherwise clear.    Pleural space:  Unremarkable.  No pneumothorax.    Heart:  Unremarkable.    Mediastinum:  Unremarkable.    Bones/joints:  Unremarkable.          Impression:       1.  No acute findings.    2.  Nodular density projecting over the right hilum may represent vessels on   end versus possible lymph node or nodule.  Comparison with previous imaging or   followup PA and lateral radiographs recommended.    THIS DOCUMENT HAS BEEN ELECTRONICALLY SIGNED BY CHEVY CHO MD    CT Abdomen Pelvis Without Contrast [[id number]] Updated:  07/26/17 0001        I have personally reviewed and  interpreted available lab data, radiology studies and ECG obtained at time of admission.     Assessment / Plan     Problem List:   Hospital Problem List     * (Principal)Chest pain    Minimal cognitive impairment    Hypertension    Hyperlipidemia    Abdominal pain    History of prostate cancer    Angina at rest    Lung nodule    Mild renal insufficiency          Assessment:  - Chest Pain  - Abdominal Pain  - ELEVATED LIPASE  - HTN- UNCONTROLLED, STARTED NORVASC TODAY/.     Plan:  - Admit to telemetry  - VS q4h  - Troponin in ED negative   - Trend tonight  - Nitro paste in ED   - Continue on admission  - ASA 324mg in ED   - Continue on admission, 81 mg  - Stress in AM, WILL KEEP NPO AFTER MIDNIGHT  - AM Labs  - +/- Cardiology inpatient consult   - Will most likely need outpatient follow up   - Continue home medications as appropriate  - NPO  - CT Abdomen/Pelvis pending at time of admission-WILL FOLLOW RESULTS.   - EKG in AM  - HEART score: 5  -HOLD DIURETICS. RECHECK RENAL FUNCTION IN AM  - GET CT OF CHEST TO EVALUATE LUNG NODULE.       DVT prophylaxis: TEDs/SCDs/Heparin SQ  Code Status: Full  Admission Status: Patient will be admitted to OBSERVATION status, however if further evaluation or treatment plans warrant, status may change.  Based upon current information, I predict patient's care encounter to be less than or equal to 2 midnights.     Franny Linares, APRN 07/25/17 11:50 PM      Brief Attending Note       I have seen and examined the patient, performing an independent face-to-face diagnostic evaluation with plan of care reviewed and developed with the advanced practice clinician (APC).      Brief Summary Statement/HPI:     Mauricio Barber is a 67 y.o. male WITH H/O HTN. DEVELOPED SUDDEN ONSET OF LIGHTED HEADEDNESS. CHEST TIGHTNESS. AND TINGLING IN BOTH ARMS WHILE SITTING IN A RECLINER AT 5 PM.  HE DENIES PREVIOUS CP. ALSO WITH C/O INCREASED BM FREQUENCY AND WALKS 45 MINUTES ON TREADMILL DAILY. HAS RECENT  UNCONTROLLED HTN AND STARTED NORVASC TODAY. CAME TO [facility] ED AFTER SX STARTED AT 5PM. INITIAL TROP WAS NEGATIVE. HE WILL BE ADMITTED FOR SERIAL TROP.     CHEST XRAY NOTED A POSSIBLE LUNG NODULE. WILL GET CHEST CT IN THE AM.       The patient's allergies, problem list, current medications, family history, past medical history, past surgical history and social history have been reviewed.     Attending Physical Exam:  Vitals reviewed-See APC noted  Gen-no acute distress  HEENT-atraumatic, normocephalic, PERRLA, EOMI, moist mucous membranes present  CV-RRR, S1 S2 normal, No Murmur, No Gallop  Resp-CTAB, no wheezes or rales; normal respiratory effort  Abd-soft, nontender, distended, normal BS.   Ext-no edema or clubbing, pedal pulses intact  Skin-warm, dry, no rashes or lesions noted  Neuro-A&Ox3, equal strength in upper and lower extremities; some mild cognitive impairment.   Psych-appropriate mood and behavior      Brief Assessment/Plan :  See above for further detailed assessment and plan developed with APC which I have reviewed and/or edited.       Asiya Cole DO  07/26/17  3:23 AM

## 2017-07-26 NOTE — PROGRESS NOTES
Discharge Planning Assessment  [facility]     Patient Name: Mauricio Barber  MRN: [id number]  Today's Date: 7/26/2017    Admit Date: 7/25/2017          Discharge Needs Assessment       07/26/17 1324    Living Environment    Lives With spouse   Pt resides in [facility] with wife Mariely     Living Arrangements house    Type of Financial/Environmental Concern none    Transportation Available car;family or friend will provide    Living Environment    Provides Primary Care For no one    Quality Of Family Relationships supportive;helpful;involved    Able to Return to Prior Living Arrangements yes    Discharge Needs Assessment    Concerns To Be Addressed no discharge needs identified;denies needs/concerns at this time    Readmission Within The Last 30 Days no previous admission in last 30 days    Anticipated Changes Related to Illness none    Equipment Currently Used at Home bipap/ cpap    Equipment Needed After Discharge none    Discharge Contact Information if Applicable [phone]            Discharge Plan       07/26/17 1328    Case Management/Social Work Plan    Plan home    Patient/Family In Agreement With Plan yes    Additional Comments Spoke with pt at bedside, pt denies discharge needs and plans to return home with his wife.        Discharge Placement     No information found        Expected Discharge Date and Time     Expected Discharge Date Expected Discharge Time    Jul 28, 2017               Demographic Summary       07/26/17 1321    Referral Information    Referral Source admission list    Contact Information    Permission Granted to Share Information With     Primary Care Physician Information    Name Thony Jamison            Functional Status       07/26/17 1321    Functional Status Current    Current Functional Level Comment see previous charting    Functional Status Prior    Ambulation 0-->independent    Transferring 0-->independent    Toileting 0-->independent    Bathing  0-->independent    Dressing 0-->independent    Eating 0-->independent    Communication 0-->understands/communicates without difficulty    Swallowing 0-->swallows foods/liquids without difficulty    IADL    Medications independent   Pt confirms he has prescription drug  coverage  and denies issues obtaining or affording medications    Meal Preparation independent    Housekeeping independent    Laundry independent    Shopping independent    Oral Care independent    Activity Tolerance    Current Activity Limitations none    Usual Activity Tolerance good    Current Activity Tolerance moderate    Employment/Financial    Financial Concerns none   pt confirms he has Medicare and [employer] and denies concerns or disruption in coverage            Psychosocial     None            Abuse/Neglect     None            Legal     None            Substance Abuse     None            Patient Forms     None          Susan Blair

## 2017-07-26 NOTE — PLAN OF CARE
Problem: Patient Care Overview (Adult)  Goal: Plan of Care Review  Outcome: Ongoing (interventions implemented as appropriate)    07/26/17 0532   Coping/Psychosocial Response Interventions   Plan Of Care Reviewed With patient   Patient Care Overview   Progress no change   Outcome Evaluation   Outcome Summary/Follow up Plan received pt from ED. no complaints of chest pain. pt NPO for stress test today.         Problem: Acute Coronary Syndrome (ACS) (Adult)  Goal: Signs and Symptoms of Listed Potential Problems Will be Absent or Manageable (Acute Coronary Syndrome)  Outcome: Ongoing (interventions implemented as appropriate)    07/26/17 0532   Acute Coronary Syndrome (ACS)   Problems Assessed (Acute Coronary Syndrome (ACS)) all   Problems Present (Acute Coronary Syndrome (ACS)) none

## 2017-07-27 VITALS
WEIGHT: 201.6 LBS | SYSTOLIC BLOOD PRESSURE: 107 MMHG | TEMPERATURE: 97.9 F | BODY MASS INDEX: 27.3 KG/M2 | HEART RATE: 52 BPM | HEIGHT: 72 IN | RESPIRATION RATE: 18 BRPM | DIASTOLIC BLOOD PRESSURE: 57 MMHG | OXYGEN SATURATION: 94 %

## 2017-07-27 PROBLEM — R19.7 DIARRHEA OF PRESUMED INFECTIOUS ORIGIN: Status: ACTIVE | Noted: 2017-07-27

## 2017-07-27 LAB
ALBUMIN SERPL-MCNC: 3.7 G/DL (ref 3.2–4.8)
ALBUMIN/GLOB SERPL: 1.4 G/DL (ref 1.5–2.5)
ALP SERPL-CCNC: 108 U/L (ref 25–100)
ALT SERPL W P-5'-P-CCNC: 28 U/L (ref 7–40)
ANION GAP SERPL CALCULATED.3IONS-SCNC: 8 MMOL/L (ref 3–11)
AST SERPL-CCNC: 21 U/L (ref 0–33)
BILIRUB SERPL-MCNC: 0.6 MG/DL (ref 0.3–1.2)
BUN BLD-MCNC: 19 MG/DL (ref 9–23)
BUN/CREAT SERPL: 19 (ref 7–25)
CALCIUM SPEC-SCNC: 8.8 MG/DL (ref 8.7–10.4)
CHLORIDE SERPL-SCNC: 106 MMOL/L (ref 99–109)
CO2 SERPL-SCNC: 22 MMOL/L (ref 20–31)
CREAT BLD-MCNC: 1 MG/DL (ref 0.6–1.3)
DEPRECATED RDW RBC AUTO: 47.4 FL (ref 37–54)
ERYTHROCYTE [DISTWIDTH] IN BLOOD BY AUTOMATED COUNT: 13.2 % (ref 11.3–14.5)
GFR SERPL CREATININE-BSD FRML MDRD: 74 ML/MIN/1.73
GLOBULIN UR ELPH-MCNC: 2.7 GM/DL
GLUCOSE BLD-MCNC: 82 MG/DL (ref 70–100)
HCT VFR BLD AUTO: 39.7 % (ref 38.9–50.9)
HGB BLD-MCNC: 12.9 G/DL (ref 13.1–17.5)
LIPASE SERPL-CCNC: 227 U/L (ref 6–51)
MCH RBC QN AUTO: 31.9 PG (ref 27–31)
MCHC RBC AUTO-ENTMCNC: 32.5 G/DL (ref 32–36)
MCV RBC AUTO: 98.3 FL (ref 80–99)
PLATELET # BLD AUTO: 185 10*3/MM3 (ref 150–450)
PMV BLD AUTO: 10.1 FL (ref 6–12)
POTASSIUM BLD-SCNC: 4.3 MMOL/L (ref 3.5–5.5)
PROT SERPL-MCNC: 6.4 G/DL (ref 5.7–8.2)
RBC # BLD AUTO: 4.04 10*6/MM3 (ref 4.2–5.76)
SODIUM BLD-SCNC: 136 MMOL/L (ref 132–146)
WBC NRBC COR # BLD: 7.04 10*3/MM3 (ref 3.5–10.8)

## 2017-07-27 PROCEDURE — 83690 ASSAY OF LIPASE: CPT | Performed by: HOSPITALIST

## 2017-07-27 PROCEDURE — 25010000002 HEPARIN (PORCINE) PER 1000 UNITS: Performed by: NURSE PRACTITIONER

## 2017-07-27 PROCEDURE — 99217 PR OBSERVATION CARE DISCHARGE MANAGEMENT: CPT | Performed by: HOSPITALIST

## 2017-07-27 PROCEDURE — G0378 HOSPITAL OBSERVATION PER HR: HCPCS

## 2017-07-27 PROCEDURE — 80053 COMPREHEN METABOLIC PANEL: CPT | Performed by: HOSPITALIST

## 2017-07-27 PROCEDURE — 96372 THER/PROPH/DIAG INJ SC/IM: CPT

## 2017-07-27 PROCEDURE — 85027 COMPLETE CBC AUTOMATED: CPT | Performed by: HOSPITALIST

## 2017-07-27 RX ADMIN — CITALOPRAM HYDROBROMIDE 20 MG: 20 TABLET ORAL at 08:23

## 2017-07-27 RX ADMIN — MEMANTINE 10 MG: 10 TABLET ORAL at 08:26

## 2017-07-27 RX ADMIN — AMLODIPINE BESYLATE 5 MG: 5 TABLET ORAL at 08:24

## 2017-07-27 RX ADMIN — HEPARIN SODIUM 5000 UNITS: 5000 INJECTION, SOLUTION INTRAVENOUS; SUBCUTANEOUS at 08:23

## 2017-07-27 RX ADMIN — LISINOPRIL 40 MG: 40 TABLET ORAL at 08:23

## 2017-07-27 RX ADMIN — ASPIRIN 81 MG: 81 TABLET, COATED ORAL at 08:23

## 2017-07-27 NOTE — PLAN OF CARE
Problem: Patient Care Overview (Adult)  Goal: Plan of Care Review  Outcome: Ongoing (interventions implemented as appropriate)    07/27/17 0436   Coping/Psychosocial Response Interventions   Plan Of Care Reviewed With patient   Patient Care Overview   Progress improving   Outcome Evaluation   Outcome Summary/Follow up Plan VSS. pt has had no c/o pain or discomfort this evening. although he has had some episodes of diarrhea.          Problem: Acute Coronary Syndrome (ACS) (Adult)  Goal: Signs and Symptoms of Listed Potential Problems Will be Absent or Manageable (Acute Coronary Syndrome)  Outcome: Ongoing (interventions implemented as appropriate)    07/27/17 0436   Acute Coronary Syndrome (ACS)   Problems Assessed (Acute Coronary Syndrome (ACS)) all   Problems Present (Acute Coronary Syndrome (ACS)) none

## 2017-07-27 NOTE — DISCHARGE SUMMARY
[facility] Hospital Medicine Services  DISCHARGE SUMMARY       Date of Admission: 7/25/2017  Date of Discharge:  7/27/2017  Primary Care Physician: Thony Jamison MD  Consulting Physician(s)          None         Discharge Diagnoses:  Active Hospital Problems (** Indicates Principal Problem)    Diagnosis Date Noted   • **Chest pain [R07.9] 07/25/2017   • Diarrhea of presumed infectious origin [A09] 07/27/2017   • Angina at rest [I20.8] 07/26/2017   • Lung nodule [R91.1] 07/26/2017   • Mild renal insufficiency [N28.9] 07/26/2017   • Hyperlipidemia [E78.5] 07/25/2017   • Abdominal pain [R10.9] 07/25/2017   • History of prostate cancer [Z85.46] 07/25/2017   • Minimal cognitive impairment [G31.84] 03/27/2017   • Hypertension [I10] 03/27/2017      Resolved Hospital Problems    Diagnosis Date Noted Date Resolved   No resolved problems to display.       Presenting Problem/History of Present Illness  Angina at rest [I20.8]     Chief Complaint on Day of Discharge: Follow-up chest pain  History of Present Illness on Day of Discharge: Patient is without recurrent episodes of chest pain.  Only complaint overnight is watery, nonbloody diarrhea that occurred 3-4 times in a 12 hour period without abdominal pain, nausea, vomiting, or fever.  No recent antibiotic exposure and no history of Clostridium difficile infection.  Patient requests discharge home.  Tolerating regular diet.      Hospital Course  Patient is a 67-year-old male with a past history of Alzheimer's dementia, hypertension, hyperlipidemia, depression, syncope with recent negative holter monitor, remote tobacco abuse (multiple packs a day X 15 years, quit 35 years ago), and prostate cancer who presented to the [facility] emergency department on 7/25 for evaluation of the abrupt onset of lightheadedness, chest tightness, and bilateral arm tingling that began on 725 at 5 PM while at rest.  Patient's initial evaluation disclosed mild  hypertension and he also reported some abdominal discomfort.  His ECGs and troponins were without evidence of acute ischemia and he was admitted to observation status for further evaluation of life-threatening causes of chest pain. NSTEMI was ruled out with serial troponins. He ultimately underwent a nuclear PET stress test that was normal and showed no evidence of active ischemia.  Patient was without hypoxia or tachycardia that would raise critical suspicion for pulmonary embolus and his chest pain was relatively self-limited.  Aortic dissection was similarly felt to be very unlikely.    During his initial evaluation patient was noted to have an incidentally found elevated serum lipase.  This was repeated on 7/26 and had increased.  Serum amylase was also elevated.  His CT of the abdomen and pelvis at admission showed no evidence of peripancreatic stranding or evidence of acute pancreatitis however, given his dementia, it is possible that his episode of chest pain as well as nausea was related to a transient mild episode of acute pancreatitis.  On 7/27 his symptoms have completely resolved and thus no further evaluation or treatment for plan for this with recommendation for continued oral diet as tolerated. Serum lipase on AM 7/27 had decreased.  Gallbladder ultrasound was performed to evaluate for possible gallstones which were seen without evidence of inflammatory changes to the gallbladder or evidence of cholecystitis. His CBD was of normal caliber at 4mm and thus, no further work-up was pursued.     Patient's initial imaging in the ER incidentally noted a 4 mm right lower lobe pulmonary nodule.  Given patient's history of tobacco abuse, he needs a repeat CT of the chest in 6 months time and then likely as subsequent repeat study thereafter to document clinical stability as per Fleischner criteria.  I defer this to his primary care physician to arrange and follow.  Patient and his spouse were counseled  extensively on this.    On day of discharge, patient did note some acute diarrhea without hematochezia or abdominal pain.  As patient was afebrile and with normal white blood cell count, further evaluation was not pursued.  He had no known risk factors for clostridium difficile colitis.  I counseled his wife on adequate oral hydration as well as supportive measures for diarrhea, probably due to mild gastroenteritis.     At time of discharge, no changes were made to patient's chronic medications.  He will continue his home antihypertensives as well as aspirin and atorvastatin.  Blood pressure was stable during his care.      Procedures Performed  ECG/EMG Results (last 7 days)     Procedure Component Value Units Date/Time    ECG 12 Lead [[id number]] Collected:  07/25/17 2149     Updated:  07/25/17 2203    Narrative:       Test Reason : cp  Blood Pressure : **/** mmHG  Vent. Rate : 063 BPM     Atrial Rate : 063 BPM     P-R Int : 158 ms          QRS Dur : 078 ms      QT Int : 398 ms       P-R-T Axes : 036 019 035 degrees     QTc Int : 407 ms    Normal sinus rhythm  No previous ECGs available  Confirmed by HALLE CLAYTON MD ([id number]) on 7/25/2017 10:03:31 PM    Referred By:  EDWR           Confirmed By:HALLE CLAYTON MD    ECG 12 Lead [[id number]] Collected:  07/26/17 0017     Updated:  07/26/17 0028    Narrative:       Test Reason : 2ND SET  Blood Pressure : **/** mmHG  Vent. Rate : 052 BPM     Atrial Rate : 052 BPM     P-R Int : 158 ms          QRS Dur : 078 ms      QT Int : 422 ms       P-R-T Axes : 043 020 046 degrees     QTc Int : 392 ms    Sinus bradycardia  Otherwise normal ECG  When compared with ECG of 25-JUL-2017 21:49,  No significant change was found  Confirmed by HALLE CLAYTON MD ([id number]) on 7/26/2017 12:28:47 AM    Referred By:  ED MD           Confirmed By:HALLE CLAYTON MD    ECG 12 Lead [[id number]] Collected:  07/26/17 0417     Updated:  07/26/17 0647    Narrative:       Test Reason : chest pain  Blood Pressure :  **/** mmHG  Vent. Rate : 051 BPM     Atrial Rate : 051 BPM     P-R Int : 162 ms          QRS Dur : 084 ms      QT Int : 452 ms       P-R-T Axes : 032 019 036 degrees     QTc Int : 416 ms    Sinus bradycardia  Otherwise normal ECG  When compared with ECG of 26-JUL-2017 00:17,  No significant change was found    Referred By:  NEFTALY           Confirmed By:         Stress Test with PET Myocardial Perfusion Imaging 7/26  Interpretation Summary      · Left ventricular ejection fraction is hyperdynamic (Calculated EF > 70%).  · Myocardial perfusion imaging indicates a normal myocardial perfusion study with no evidence of ischemia.  · Impressions are consistent with a low risk study.            Consults:   Consults     No orders found from 6/26/2017 to 7/26/2017.          Pertinent Test Results:    Results from last 7 days  Lab Units 07/27/17 0522 07/26/17  0736 07/25/17  2151   WBC 10*3/mm3 7.04 7.23 9.32   HEMOGLOBIN g/dL 12.9* 13.2 14.2   HEMATOCRIT % 39.7 40.1 42.6   PLATELETS 10*3/mm3 185 213 227         Results from last 7 days  Lab Units 07/27/17 0522 07/26/17  0736 07/25/17  2151   SODIUM mmol/L 136 136 138   POTASSIUM mmol/L 4.3 4.4 4.3   CHLORIDE mmol/L 106 102 104   CO2 mmol/L 22.0 27.0 28.0   BUN mg/dL 19 26* 25*   CREATININE mg/dL 1.00 1.30 1.30   CALCIUM mg/dL 8.8 9.2 9.7   BILIRUBIN mg/dL 0.6  --  0.5   ALK PHOS U/L 108*  --  135*   ALT (SGPT) U/L 28  --  30   AST (SGOT) U/L 21  --  25   GLUCOSE mg/dL 82 89 110*       Results from last 7 days  Lab Units 07/26/17  0736   CHOLESTEROL mg/dL 114   TRIGLYCERIDES mg/dL 148   HDL CHOL mg/dL 30*   LDL 65      Lab Results  Lab Value Date/Time   LIPASE 227 (H) 07/27/2017 0522   LIPASE 380 (H) 07/26/2017 0736   LIPASE 120 (H) 07/25/2017 2151     TSH 2.24      Results from last 7 days  Lab Units 07/26/17  0736   HEMOGLOBIN A1C % 5.60       Imaging Results (all)     Procedure Component Value Units Date/Time    XR Chest 1 View [[id number]]  (Abnormal) Collected:  07/25/17  2151     Updated:  07/25/17 2304    Narrative:       EXAM:    XR Chest, 1 View    CLINICAL HISTORY:    67 years old, male; Pain; Chest pain; Type not specified; Additional info:   Chest pain triage protocol    TECHNIQUE:    Frontal view of the chest.    COMPARISON:    No relevant prior studies available.    FINDINGS:    Lungs: Small nodular density projecting over the right hilum. The lungs are   otherwise clear.    Pleural space:  Unremarkable.  No pneumothorax.    Heart:  Unremarkable.    Mediastinum:  Unremarkable.    Bones/joints:  Unremarkable.          Impression:       1.  No acute findings.    2.  Nodular density projecting over the right hilum may represent vessels on   end versus possible lymph node or nodule.  Comparison with previous imaging or   followup PA and lateral radiographs recommended.    THIS DOCUMENT HAS BEEN ELECTRONICALLY SIGNED BY CHEVY CHO MD    CT Abdomen Pelvis Without Contrast [[id number]]  (Abnormal) Collected:  07/25/17 2339     Updated:  07/26/17 0014    Narrative:       EXAM:    CT Abdomen and Pelvis Without Intravenous Contrast    CLINICAL HISTORY:    67 years old, male; Dementia in other diseases classified elsewhere without   behavioral disturbance; Alzheimer's disease, unspecified; Essential (primary)   hypertension; Other forms of angina pectoris; Pain and signs and symptoms;   Nausea; Abdominal pain; Generalized; Prior surgery; Surgery date: 6+ months;   Surgery type: Prostate; Additional info: Abd pain    TECHNIQUE:    Axial computed tomography images of the abdomen and pelvis without   intravenous contrast.  This CT exam was performed using one or more of the   following dose reduction techniques:  automated exposure control, adjustment of   the mA and/or kV according to patient size, and/or use of iterative   reconstruction technique.    Coronal reformatted images were created and reviewed.    COMPARISON:    No relevant prior studies available.    FINDINGS:    Lower  thorax:  4-5 mm right lower lobe nodule adjacent to the minor fissure.     ABDOMEN:    Liver: Few calcified granulomas.    Gallbladder and bile ducts:  Unremarkable.    Pancreas:  Normal.    Spleen:  Calcified splenic granulomas.    Adrenals:  Normal.    Kidneys and ureters:  Punctate nonobstructive right renal stone.  Otherwise   unremarkable.    Stomach and bowel:  Unremarkable.  No obstruction.    Appendix:  No findings to suggest acute appendicitis.     PELVIS:    Bladder:  Unremarkable.    Reproductive:  Prostatectomy.     ABDOMEN and PELVIS:    Intraperitoneal space:  No free air.  No significant fluid collection.    Bones/joints:  Unremarkable. No acute fracture.    Soft tissues:  Unremarkable.    Vasculature:  Unremarkable.    Lymph nodes:  Unremarkable.  No enlarged lymph nodes.      Impression:       1.  No acute findings.  2.  5 mm right lower lobe nodule.          Fleischner Society guidelines for management of small pulmonary nodules   incidentally detected on CT:.    Nodule >4-6 mm:    Low risk: Follow-up CT at 12 months; if unchanged, no further follow-up.    High risk: Initial follow-up CT at 6-12 months, then at 18-24 months if no   change.    THIS DOCUMENT HAS BEEN ELECTRONICALLY SIGNED BY CHEVY CHO MD    US Gallbladder [[id number]] Collected:  07/27/17 1300     Updated:  07/27/17 1300    Narrative:       EXAMINATION: US GALLBLADDER-07/26/2017:     INDICATION: Evaluate for cholelithiasis or dilated CBD, here with  possible pancreatitis; I20.8-Other forms of angina pectoris;  G30.9-Alzheimer's disease, unspecified; F02.80-Dementia in other  diseases classified elsewhere without behavioral disturbance;  I10-Essential (primary) hypertension, right upper quadrant pain.     TECHNIQUE: Sonographic imaging was obtained of the right upper quadrant  in both the sagittal and transverse planes.     COMPARISON: NONE.     FINDINGS: The pancreas is homogeneous in appearance without evidence of  focal mass or  abnormal fluid collection. The liver is increased in  echogenicity suggesting diffuse fatty infiltration. No focal mass or  intrahepatic biliary ductal dilatation. The gallbladder reveals evidence  of stones, no gallbladder wall thickening or biliary ductal dilatation.  The common bile duct measures 4 mm. The right kidney is normal in size,  configuration, and texture measuring in length from pole to pole 10.9  cm. Several echogenic foci seen throughout the kidneys suggesting  nonobstructing stones. No hydronephrosis, solid cortical mass or renal  cortical cyst identified.       Impression:       1.  Gallstones with no evidence of gallbladder wall thickening or  biliary ductal dilatation.  2.  Nonobstructing stones seen within the kidneys bilaterally.     D:  07/27/2017  E:  07/27/2017              CT Chest With Contrast [[id number]] Collected:  07/27/17 1313     Updated:  07/27/17 1313    Narrative:       EXAMINATION: CT CHEST W CONTRAST-      INDICATION: Lung nodule; I20.8-Other forms of angina pectoris;  G30.9-Alzheimer's disease, unspecified; F02.80-Dementia in other  diseases classified elsewhere without behavioral disturbance;  I10-Essential (primary) hypertension.     TECHNIQUE: Spiral acquisition post IV contrast 5 mm axial images through  the chest and upper abdomen     The radiation dose reduction device was turned on for each scan per the  ALARA (As Low as Reasonably Achievable) protocol.     COMPARISON: Abdominal CT scan of earlier same date, showing 5 mm right  lower lung nodule.     FINDINGS: By my measurement, a small nodular density within the right  minor fissure measures between 4.0 and 4.5 mm in maximal dimensions on  the scan from earlier today. On this scan, nodule is very subtle, and  could easily be mistaken for a vessel seen in cross section. There is no  evidence of any other noncalcified pulmonary parenchymal nodule or other  significant pulmonary parenchymal disease. There are  "numerous  granulomatous calcifications in the right hilum, and also apparently  within peripheral right upper lobe hilar nodes. A faintly calcified node  is seen in the right middle lobe, hilar bronchovascular bundle as well.  It is overwhelmingly likely that the small nodule within the right minor  fissure is related to patient's granulomatous disease.     Mediastinal window images show no evidence of any noncalcified  adenopathy and no pericardial effusion. Thoracic aorta and pulmonary  arteries appear grossly normal. Bony structures appear grossly intact.       Impression:       Faint 4 to 5 mm nodular density within the right minor  fissure as on previous abdominal CT scan. Numerous granulomatous  calcifications of the right perihilar region. Accordingly, noncalcified  granuloma is considered most likely etiology. By lung RADS guidelines,  this nodule would be considered lung RADS category II, with followup  suggested at 12 months.     D:  07/27/2017  E:  07/27/2017             Condition on Discharge:  stable    Physical Exam on Discharge:/57 (BP Location: Left arm, Patient Position: Lying)  Pulse 52  Temp 97.9 °F (36.6 °C) (Oral)   Resp 18  Ht 72\" (182.9 cm)  Wt 201 lb 9.6 oz (91.4 kg)  SpO2 94%  BMI 27.34 kg/m2  Physical Exam  Constitutional: no acute distress, awake, alert  Respiratory: Clear to auscultation bilaterally, nonlabored respirations   Cardiovascular: RRR, no murmurs, rubs, or gallops, palpable pedal pulses bilaterally  Gastrointestinal: Positive bowel sounds, soft, nontender, nondistended, obese  Musculoskeletal: No bilateral ankle edema  Psychiatric: mildly confused, appropriate affect, cooperative  Neurologic: Strength symmetric in all extremities       Discharge Disposition  Home or Self Care    Discharge Medications   Mauricio Barber   Home Medication Instructions HANSEL:[id number]    Printed on:07/27/17 0845   Medication Information                      amLODIPine (NORVASC) 5 MG " tablet  Take 5 mg by mouth Daily.             aspirin 81 MG EC tablet  Take 81 mg by mouth Daily.             atorvastatin (LIPITOR) 10 MG tablet  Take  by mouth.             citalopram (CeleXA) 20 MG tablet  Take  by mouth.             donepezil (ARICEPT) 10 MG tablet  Take  by mouth.             lisinopril (PRINIVIL,ZESTRIL) 40 MG tablet  Take 40 mg by mouth Daily.             memantine (NAMENDA) 10 MG tablet  Take 10 mg by mouth 2 (Two) Times a Day.                 Discharge Diet:   Diet Instructions     Advance Diet As Tolerated                     Discharge Care Plan / Instructions:  - Pt needs a repeat CT Chest in 6-12 months, then at 18-24 months if no change. This needs to be arranged through PCP.       Activity at Discharge:   Activity Instructions     Activity as Tolerated                     Follow-up Appointments  No future appointments.  Additional Instructions for the Follow-ups that You Need to Schedule     Discharge Follow-up with PCP    As directed    Follow Up Details:  in 1-2 weeks                 Test Results Pending at Discharge       Kumar Andrew MD 07/27/17 8:45 AM    Time: Discharge 35 min    Please note that portions of this note may have been completed with a voice recognition program. Efforts were made to edit the dictations, but occasionally words are mistranscribed.

## 2018-01-08 ENCOUNTER — TRANSCRIBE ORDERS (OUTPATIENT)
Dept: ADMINISTRATIVE | Facility: HOSPITAL | Age: 68
End: 2018-01-08

## 2018-01-08 DIAGNOSIS — R47.89: Primary | ICD-10-CM

## 2018-01-10 ENCOUNTER — HOSPITAL ENCOUNTER (OUTPATIENT)
Dept: MRI IMAGING | Facility: HOSPITAL | Age: 68
Discharge: HOME | End: 2018-01-10
Admitting: NURSE PRACTITIONER

## 2018-01-10 DIAGNOSIS — R47.89: ICD-10-CM

## 2018-01-10 PROCEDURE — 70551 MRI BRAIN STEM W/O DYE: CPT

## 2018-02-20 ENCOUNTER — OFFICE VISIT (OUTPATIENT)
Dept: NEUROLOGY | Facility: CLINIC | Age: 68
End: 2018-02-20

## 2018-02-20 VITALS
DIASTOLIC BLOOD PRESSURE: 50 MMHG | BODY MASS INDEX: 28.58 KG/M2 | HEART RATE: 60 BPM | WEIGHT: 211 LBS | OXYGEN SATURATION: 99 % | HEIGHT: 72 IN | SYSTOLIC BLOOD PRESSURE: 124 MMHG

## 2018-02-20 DIAGNOSIS — R41.89 COGNITIVE IMPAIRMENT: Primary | ICD-10-CM

## 2018-02-20 DIAGNOSIS — C61 PROSTATE CANCER (HCC): ICD-10-CM

## 2018-02-20 DIAGNOSIS — R40.4 TRANSIENT ALTERATION OF AWARENESS: ICD-10-CM

## 2018-02-20 PROCEDURE — 99214 OFFICE O/P EST MOD 30 MIN: CPT | Performed by: NURSE PRACTITIONER

## 2018-02-20 RX ORDER — LISINOPRIL 20 MG/1
20 TABLET ORAL DAILY
COMMUNITY
End: 2018-02-20

## 2018-02-20 RX ORDER — ATORVASTATIN CALCIUM 40 MG/1
40 TABLET, FILM COATED ORAL DAILY
COMMUNITY

## 2018-02-20 RX ORDER — LISINOPRIL AND HYDROCHLOROTHIAZIDE 20; 12.5 MG/1; MG/1
TABLET ORAL
Refills: 1 | COMMUNITY
Start: 2018-01-20

## 2018-02-20 RX ORDER — BISOPROLOL FUMARATE AND HYDROCHLOROTHIAZIDE 2.5; 6.25 MG/1; MG/1
1 TABLET ORAL DAILY
COMMUNITY

## 2018-02-20 NOTE — PROGRESS NOTES
Subjective:     Patient ID: Mauricio Barber is a 68 y.o. male.    CC:   Chief Complaint   Patient presents with   • Dizziness   • Memory Loss       HPI:   History of Present Illness     Mr. Barber is a 68-year-old male returns to the clinic today with his wife.  He was last seen here in 2015 for mild cognitive impairment, at that time he was on donepezil 10 mg and doing well.  He was apparently lost to follow-up and has been seeing his primary care physician who has been prescribing his to donepazil and actually added Namenda approximately one year ago for increased memory concerns.  His wife brings him in today with complaints of 3 episodes which have been concerning to her since December.  She states that he will become suddenly weak, he describes his limbs as heavy, he has slowed speech and delayed processing during the episodes.  These may last several minutes, when he returns to baseline he feels fatigued and wants to lie down and sleep for several hours.  First episode was in December 2017, he had 2 episodes in January and none since.  His wife did check his blood pressure during the last episode which was 110/40.  He did see his PCP soon after, no blood pressure medication changes were made.  He denied any chest pain or shortness of breath during the episode, he had cardiac workup in July 2017 done at [facility] which included a stress test  Indicating a normal myocardial perfusion study with no evidence of ischemia.  This was read as a low risk for ischemia.   Wife has not witnessed any tongue biting or convulsions.  He does have urinary incontinence which is his baseline from previous prostate surgery secondary to prostate cancer.  PCP did order an MRI of the brain which was done in January and read as normal aging with no changes.  Wife reports complete blood work has been done in the last month to 2 months back from a care and normal.  Overall his wife has noted significant decline in cognition and memory in  the past couple years.  He easily recognizes people but forgets he has showered or eaten.  He does not drive.  He dresses himself and feed himself with no problems.  Denies any hallucinations.   He does exercise on a treadmill daily with no issues other than some right knee pain and left hip pain which is followed by primary care.    The following portions of the patient's history were reviewed and updated as appropriate: allergies, current medications, past family history, past medical history, past social history, past surgical history and problem list.    Past Medical History:   Diagnosis Date   • Alzheimer disease    • Cancer    • Colon polyp    • Depression    • High cholesterol    • Hyperlipidemia    • Hypertension    • Prostate cancer        Past Surgical History:   Procedure Laterality Date   • PROSTATE SURGERY     • PROSTATE SURGERY         Social History     Social History   • Marital status:      Spouse name: N/A   • Number of children: N/A   • Years of education: N/A     Occupational History   • Not on file.     Social History Main Topics   • Smoking status: Former Smoker   • Smokeless tobacco: Not on file   • Alcohol use No   • Drug use: No   • Sexual activity: Defer     Other Topics Concern   • Not on file     Social History Narrative       Family History   Problem Relation Age of Onset   • Cancer Mother    • Heart disease Father    • No Known Problems Brother    • Hypothyroidism Daughter    • No Known Problems Sister    • Cancer Brother      Prostate        Review of Systems   Constitutional: Negative for chills, fatigue, fever and unexpected weight change.   HENT: Negative.  Negative for ear pain, hearing loss, nosebleeds, rhinorrhea and sore throat.    Eyes: Positive for visual disturbance (Followed by ophthalmology, diagnosed with cataracts). Negative for photophobia, pain, discharge and itching.   Respiratory: Negative.  Negative for cough, chest tightness, shortness of breath and wheezing.     Cardiovascular: Negative.  Negative for chest pain, palpitations and leg swelling.   Gastrointestinal: Negative.  Negative for abdominal pain, blood in stool, constipation, diarrhea, nausea and vomiting.   Endocrine: Negative.    Genitourinary: Negative.  Negative for dysuria, frequency, hematuria and urgency.   Musculoskeletal: Negative for arthralgias, back pain, gait problem, joint swelling, myalgias, neck pain and neck stiffness.   Skin: Negative for rash and wound.   Allergic/Immunologic: Negative.  Negative for environmental allergies and food allergies.   Neurological: Positive for dizziness, weakness, light-headedness and numbness. Negative for tremors, seizures, syncope, speech difficulty and headaches.        All neurological symptoms are present only during episodes as noted in history of present illness.  Otherwise no complaints of weakness, dizziness, headaches or speech difficulties on a regular basis.   Hematological: Negative for adenopathy.   Psychiatric/Behavioral: Positive for confusion, decreased concentration and dysphoric mood. Negative for agitation, hallucinations, sleep disturbance and suicidal ideas.        Objective:    Neurologic Exam     Mental Status   Disoriented to person.   Disoriented to place. Disoriented to area. Oriented to country and city.   Disoriented to year and month. Oriented to date, day and season.   Registration: recalls 3 of 3 objects. Recall at 5 minutes: recalls 2 of 3 objects. Follows 3 step commands.   Speech: speech is normal   Level of consciousness: alert  Able to name object. Able to read. Able to repeat. Able to write.     Cranial Nerves     CN II   Visual fields full to confrontation.     CN III, IV, VI   Pupils are equal, round, and reactive to light.  Extraocular motions are normal.   CN III: no CN III palsy  CN VI: no CN VI palsy  Nystagmus: none   Upgaze: normal  Downgaze: normal    CN V   Facial sensation intact.     CN VII   Facial expression full,  symmetric.     CN IX, X   CN IX normal.   CN X normal.     CN XI   CN XI normal.     CN XII   CN XII normal.     Motor Exam   Muscle bulk: normal  Overall muscle tone: normal  Right arm pronator drift: absent  Left arm pronator drift: absent    Strength   Strength 5/5 throughout.     Sensory Exam   Light touch normal.       Physical Exam   Eyes: EOM are normal. Pupils are equal, round, and reactive to light.   Neurological: He has normal strength.   Psychiatric: His speech is normal.       Assessment/Plan:       Mauricio was seen today for dizziness and memory loss.    Diagnoses and all orders for this visit:    Cognitive impairment  -     EEG Awake or Drowsy Routine  -     Ambulatory Referral to Speech Therapy    Transient alteration of awareness  -     EEG Awake or Drowsy Routine    EEG ordered to rule out seizure activity.  Mr Barber has had 3 spells of vague symptoms of fatigue with transient mental slowing observed by his wife; these episodes are followed by drowsiness.  He does have cognitive impairement with MMSE 21, he will continue namenda and aricept.  He had recent labs with PCP that they report as stable.  I do recommend he hydrate well, these could be due to orthostatic BP changes and wife admits he does not drink any water.  He had negative stress test in July 2017.    They are interested in speech/cognitive therapy, I have placed order for this.  MRI from 1/2018 reviewed by Dr. Mcgregor, slightly enlarged ventricles however pt denies any shuffling gait and no changes from previous MRI 2014.   Reviewed medications, potential side effects and signs and symptoms to report. Discussed risk versus benefits of treatment plan with patient and/or family-including medications, labs and radiology that may be ordered. Addressed questions and concerns during visit. Patient and/or family verbalized understanding and agree with plan.  During this visit the following were done:  Labs Reviewed []    Labs Ordered []     Radiology Reports Reviewed [x]    Radiology Ordered []    PCP Records Reviewed []    Referring Provider Records Reviewed []    ER Records Reviewed []    Hospital Records Reviewed []    History Obtained From Family [x]    Radiology Images Reviewed [x]    Other Reviewed []    Records Requested []               Luzmaria Sosa, APRN  2/20/2018

## 2018-02-23 ENCOUNTER — HOSPITAL ENCOUNTER (OUTPATIENT)
Dept: NEUROLOGY | Facility: HOSPITAL | Age: 68
Discharge: HOME | End: 2018-02-23
Attending: NURSE PRACTITIONER | Admitting: NURSE PRACTITIONER

## 2018-02-23 PROCEDURE — 95819 EEG AWAKE AND ASLEEP: CPT

## 2018-02-23 NOTE — PROGRESS NOTES
Please let him know his EEG showed generalized slowing which we can see with age but no seizure activity.

## 2018-02-26 ENCOUNTER — TELEPHONE (OUTPATIENT)
Dept: NEUROLOGY | Facility: CLINIC | Age: 68
End: 2018-02-26

## 2018-03-06 ENCOUNTER — HOSPITAL ENCOUNTER (OUTPATIENT)
Dept: SPEECH THERAPY | Facility: HOSPITAL | Age: 68
Setting detail: THERAPIES SERIES
Discharge: HOME | End: 2018-03-06

## 2018-03-06 DIAGNOSIS — R41.3 MEMORY LOSS: ICD-10-CM

## 2018-03-06 DIAGNOSIS — R41.89 COGNITIVE IMPAIRMENT: Primary | ICD-10-CM

## 2018-03-06 PROCEDURE — G9168 MEMORY CURRENT STATUS: HCPCS

## 2018-03-06 PROCEDURE — 92523 SPEECH SOUND LANG COMPREHEN: CPT

## 2018-03-06 PROCEDURE — G9169 MEMORY GOAL STATUS: HCPCS

## 2018-03-06 NOTE — THERAPY EVALUATION
"Outpatient Speech Language Pathology   Adult Speech Language Cognitive Initial Evaluation  [facility]     Patient Name: Mauricio Barber  : 1950  MRN: [id number]  Today's Date: 3/6/2018        Visit Date: 2018   Patient Active Problem List   Diagnosis   • LOM (left otitis media)   • Minimal cognitive impairment   • Hypertension   • Chest pain   • Hyperlipidemia   • Abdominal pain   • History of prostate cancer   • Angina at rest   • Amnesia   • Lung nodule   • Mild renal insufficiency   • Diarrhea of presumed infectious origin        Past Medical History:   Diagnosis Date   • Alzheimer disease    • Cancer    • Colon polyp    • Depression    • High cholesterol    • Hyperlipidemia    • Hypertension    • Prostate cancer         Past Surgical History:   Procedure Laterality Date   • PROSTATE SURGERY     • PROSTATE SURGERY           Visit Dx:    ICD-10-CM ICD-9-CM   1. Cognitive impairment R41.89 294.9   2. Memory loss R41.3 780.93                 Adult Speech Language - 18 1400     Background and History    Reason for Referral Pt. was referred by Luzmaria Sosa for cognitive impairment  -AM    Stated Goals Pt. would like to improve memory skills.  -AM    Description of Complaint Pt. reports \"I forget a lot of things.\" He reports forgetting information someone has told him, forgetting what he is supposed to do/chores, and difficulty remembering any new information. He states that he noticed forgetfulness and difficulty thinking since post surgery for prostate cancer.   -AM    Previous Functional Status Functional in all spheres  -AM    Current Baseline Abilities Pt. is retired from working for the phone company. He takes care of their [place] acre property, no longer drives in the community, but will drive on their property some, goes out with wife, exercises on treadmill daily, and assists with household chores.   -AM    Pertinent Medications See meds list  -AM    Primary Language in the Home " English  -AM    Informant for the Evaluation Self  -AM    Expression    Expressive Language WFL  -AM    Receptive Language WFL  -AM    Cognitive Communication and Memory    Attention to be assessed in therapy  -AM    Orientation WFL except;time;place  -AM    Place Oriented with cues (comment)  -AM    Orientation Time Not oriented  -AM    Memory to be assessed in therapy  -AM    Executive Function to be assessed in therapy  -AM    Calculations to be assessed in therapy  -AM    Right Hemisphere Impairment WFL: Within Functional Limits  -AM    Oral Motor    Facial Appearance WFL  -AM    Dysarthria- Informal Assessment    Respiratory Support Effect on Speech WFL  -AM    Phonation Effects on Speech WFL  -AM    Articulation Effects on Speech WFL  -AM    Resonance Effects on Speech WFL  -AM    Prosody Effects on Speech WFL  -AM    Comprehensibility of Message    Message is Usually Comprehensible To: examiner  -AM    Standardized Tests    Cognitive/Memory Tests RBANS: Repeatable Battery for the Assessment of Neuropsychological Status  -AM    RBANS- Repeatable Battery for the Assessment of Neuropsychological Status    Immediate Memory Index Score 44  -AM    Immediate Memory Percentile 0 %  -AM    Immediate Memory Qualitative Description extremely low  -AM    Visuospatial Index Score 50  -AM    Visuospatial Percentile 0 %  -AM    Visuospatial Qualitative Description extremely low  -AM    Language Index Score 88  -AM    Language Percentile 21 %  -AM    Language Qualitative Description average;low average  -AM    Attention Index Score 60  -AM    Attention Percentile 0 %  -AM    Attention Qualitative Description extremely low  -AM    Delayed Memory Index Score 40  -AM    Delayed Memory Percentile 0 %  -AM    Delayed Memory Qualitative Description extremely low  -AM    Total Index Score 282  -AM    Total Percentile 0 %  -AM    Total Qualitative Description extremely low  -AM      User Key  (r) = Recorded By, (t) = Taken By, (c) =  Cosigned By    Initials Name Provider Type    AM Domonique Johnson CCC-SLP Speech and Language Pathologist                               OP SLP Education       03/06/18 1624    Education    Barriers to Learning Decreased comprehension  -AM    Action Taken to Address Barriers Included wife in education  -AM    Education Provided Described results of evaluation;Family/caregivers expressed understanding of evaluation;Patient participated in establishing goals and treatment plan;Family/caregivers participated in establishing goals and treatment plan  -AM    Assessed Learning motivation  -AM    Learning Motivation Strong  -AM    Learning Method Explanation;Demonstration  -AM    Teaching Response Verbalized understanding;Demonstrated understanding  -AM      User Key  (r) = Recorded By, (t) = Taken By, (c) = Cosigned By    Initials Name Effective Dates    AM Domonique Johnson CCC-SLP 04/19/17 -                 SLP OP Goals       03/06/18 1400       Goal Type Needed    Goal Type Needed Other Adult Goals  -AM     Subjective Comments    Subjective Comments Pt. reports that he is feeling well. No complaints this date.  -AM     Subjective Pain    Able to rate subjective pain? yes  -AM     Pre-Treatment Pain Level 0  -AM     Post-Treatment Pain Level 0  -AM     Other Goals    Other Adult Goal- 1 LTG 1: Pt. will demonstrate improvment in cognitive communication skills with use of compensatory strategies.  -AM     Status: Other Adult Goal- 1 New  -AM     Other Adult Goal- 2 STG 1: Pt. will immediately recall/repeat 5 unrelated words with 80% accuracy and one repetition PRN.   -AM     Status: Other Adult Goal- 2 New  -AM     Other Adult Goal- 3 STG 2: Pt. will recall 3 related words post 3 min with 80% accuracy and category cues.  -AM     Status: Other Adult Goal- 3 New  -AM     Other Adult Goal- 4 STG 3: Pt. will recall details from 36-50 wd. paragraphs with 80% accuracy and intermittent cues.   -AM     Status: Other Adult Goal- 4  New  -AM     Other Adult Goal- 5 STG 4: Pt. will complete divergent naming tasks (15 items/concrete category/1 min) with 80% accuracy and intermittent cues.   -AM     Status: Other Adult Goal- 5 New  -AM     Other Adult Goal- 6 STG 5: Pt. will complete ongoing assessment of organization, reasoning, and executive functioning.   -AM     Status: Other Adult Goal- 6 New  -AM     SLP Time Calculation    SLP Goal Re-Cert Due Date 06/04/18  -AM       User Key  (r) = Recorded By, (t) = Taken By, (c) = Cosigned By    Initials Name Provider Type    AM Domonique Johnson CCC-SLP Speech and Language Pathologist                OP SLP Assessment/Plan - 03/06/18 1620     SLP Assessment    Functional Problems Speech Language- Adult/Cognition  -AM    Impact on Function: Adult Speech Language/Cognition Trouble learning or remembering new information;Difficulty participating in avocational activities;Decreased recall of personal information and medical history;Difficulty sequencing thoughts to express complex messages  -AM    Clinical Impression: Speech Language-Adult/Congnition Severe:;Cognitive Communication Impairment  -AM    Clinical Impression Comments Pt. presents with severe cognitive communication deficits in the areas of memory, attention, and visuospatial skills. Ongoing evaluation of reasoning, organization, and executive functioning is recommended. Pt. will benefit from skilled SLP services.   -AM    Please refer to paper survey for additional self-reported information Yes  -AM    Please refer to items scanned into chart for additional diagnostic informaiton and handouts as provided by clinician Yes  -AM    Prognosis Good (comment)  -AM    Patient/caregiver participated in establishment of treatment plan and goals Yes  -AM    Patient would benefit from skilled therapy intervention Yes  -AM    SLP Plan    Frequency 1x/weekly  -AM    Duration 12 weeks  -AM    Planned CPT's? SLP INDIVIDUAL SPEECH THERAPY: 31486;SLP OLINDA COG  SKILLS (PER 15 MINUTES): 20451  -AM    Expected Duration Therapy Session (min) 45-60 minutes  -AM    Plan Comments Continue with evaluation. Initiate STGs  -AM      User Key  (r) = Recorded By, (t) = Taken By, (c) = Cosigned By    Initials Name Provider Type    AM VANESSA Bill Speech and Language Pathologist             SLP Outcome Measures (last 72 hours)      SLP Outcome Measures       03/06/18 1400          SLP Outcome Measures    Outcome Measure Used? Adult NOMS  -AM      FCM Scores    Memory FCM Score 3  -AM        User Key  (r) = Recorded By, (t) = Taken By, (c) = Cosigned By    Initials Name Effective Dates    AM VANESSA Bill 04/19/17 -              Time Calculation:   SLP Start Time: 1400    Therapy Charges for Today     Code Description Service Date Service Provider Modifiers Qty    97679713734 HC ST MEMORY CURRENT 3/6/2018 VANESSA Bill GN, CM 1    23889120378 HC ST MEMORY PROJECTED 3/6/2018 VANESSA Bill GN, CK 1    24474971056 HC ST EVAL SPEECH AND PROD W LANG  6 3/6/2018 VANESSA Bill GN 1          SLP G-Codes  SLP NOMS Used?: Yes  Functional Limitations: Memory  Memory Current Status (): At least 80 percent but less than 100 percent impaired, limited or restricted  Memory Goal Status (): At least 40 percent but less than 60 percent impaired, limited or restricted        VANESSA Bill  3/6/2018

## 2018-03-12 ENCOUNTER — APPOINTMENT (OUTPATIENT)
Dept: SPEECH THERAPY | Facility: HOSPITAL | Age: 68
End: 2018-03-12

## 2018-03-13 ENCOUNTER — HOSPITAL ENCOUNTER (OUTPATIENT)
Dept: SPEECH THERAPY | Facility: HOSPITAL | Age: 68
Setting detail: THERAPIES SERIES
Discharge: HOME | End: 2018-03-13

## 2018-03-13 DIAGNOSIS — R41.89 COGNITIVE IMPAIRMENT: Primary | ICD-10-CM

## 2018-03-13 DIAGNOSIS — R41.3 MEMORY LOSS: ICD-10-CM

## 2018-03-13 PROCEDURE — 92507 TX SP LANG VOICE COMM INDIV: CPT

## 2018-03-13 NOTE — THERAPY TREATMENT NOTE
Outpatient Speech Language Pathology   Adult Speech Language Cognitive Treatment Note  [facility]     Patient Name: Mauricio Barber  : 1950  MRN: [id number]  Today's Date: 3/13/2018         Visit Date: 2018   Patient Active Problem List   Diagnosis   • LOM (left otitis media)   • Minimal cognitive impairment   • Hypertension   • Chest pain   • Hyperlipidemia   • Abdominal pain   • History of prostate cancer   • Angina at rest   • Amnesia   • Lung nodule   • Mild renal insufficiency   • Diarrhea of presumed infectious origin          Visit Dx:    ICD-10-CM ICD-9-CM   1. Cognitive impairment R41.89 294.9   2. Memory loss R41.3 780.93             SLP SLC Evaluation - 18 1300        Cognitive Assessment Intervention- SLP    Thought Organization (Cognitive) abstract divergent;mild impairment;abstract convergent;mental manipulation;WNL  -AM    Reasoning (Cognitive) simple;mod-complex;deductive;mental flexibility;WFL;complex;moderate impairment;severe impairment  -AM    Problem Solving (Cognitive) temporal;moderate impairment;severe impairment;multifactorial;WFL  -AM    Executive Function (Cognition) planning;complex organization;severe impairment  -AM      User Key  (r) = Recorded By, (t) = Taken By, (c) = Cosigned By    Initials Name Provider Type    AM Domonique Johnson CCC-SLP Speech and Language Pathologist                              SLP OP Goals     Row Name 18 1300          Subjective Comments    Subjective Comments Pt. reports that he is feeling well, but is noticing ringing in his ears today. Tinnitus has been present since he was in [place] and severity goes up and down.   -AM        Subjective Pain    Able to rate subjective pain? yes  -AM     Pre-Treatment Pain Level 0  -AM     Post-Treatment Pain Level 0  -AM        Other Goals    Other Adult Goal- 1 LTG 1: Pt. will demonstrate improvment in cognitive communication skills with use of compensatory strategies.  -AM     Status: Other  Adult Goal- 1 Progressing as expected  -AM     Comments: Other Adult Goal- 1 3/13: Completed initial evaluation for organization, reasoning, and executive functioning. New STGs added.   -AM     Other Adult Goal- 2 STG 1: Pt. will immediately recall/repeat 5 unrelated words with 80% accuracy and one repetition PRN.   -AM     Status: Other Adult Goal- 2 New  -AM     Other Adult Goal- 3 STG 2: Pt. will recall 3 related words post 3 min with 80% accuracy and category cues.  -AM     Status: Other Adult Goal- 3 New  -AM     Other Adult Goal- 4 STG 3: Pt. will recall details from 36-50 wd. paragraphs with 80% accuracy and intermittent cues.   -AM     Status: Other Adult Goal- 4 New  -AM     Other Adult Goal- 5 STG 4: Pt. will complete divergent naming tasks (15 items/concrete category/1 min) with 80% accuracy and intermittent cues.   -AM     Status: Other Adult Goal- 5 Progressing as expected  -AM     Comments: Other Adult Goal- 5 3/13: Divergent naming: 15 items/concrete/1 min: 100% with intermittent cues.   -AM     Other Adult Goal- 6 STG 5: Pt. will complete ongoing assessment of organization, reasoning, and executive functioning.   -AM     Status: Other Adult Goal- 6 Achieved  -AM     Comments: Other Adult Goal- 6 3/13: COMPLETE and STGs added PRN.  -AM     Other Adult Goal- 7 STG 6: Pt. will complete temporal problems with picture with 80% accuracy and intermittent cues.  -AM     Status: Other Adult Goal- 7 New  -AM     Comments: Other Adult Goal- 7 3/13: New goal added.  -AM     Other Adult Goal- 8 STG 7: Pt. will complete planning tasks with 80% accuracy and intermittent cues.   -AM     Status: Other Adult Goal- 8 New  -AM     Comments: Other Adult Goal- 8 3/13: STG added  -AM        SLP Time Calculation    SLP Goal Re-Cert Due Date 06/04/18  -AM       User Key  (r) = Recorded By, (t) = Taken By, (c) = Cosigned By    Initials Name Provider Type    AM Domonique Johnson CCC-SLP Speech and Language Pathologist                 OP SLP Education     Row Name 03/13/18 1345       Education    Barriers to Learning Hearing deficit  -AM    Education Provided Patient demonstrated recommended strategies;Patient requires further education on strategies, risks  -AM    Assessed Learning motivation  -AM    Learning Motivation Strong  -AM    Learning Method Explanation;Demonstration  -AM    Teaching Response Verbalized understanding;Demonstrated understanding  -AM      User Key  (r) = Recorded By, (t) = Taken By, (c) = Cosigned By    Initials Name Effective Dates    AM VANESSA Bill 04/19/17 -                 OP SLP Assessment/Plan - 03/13/18 1345        SLP Plan    Plan Comments Continue with POC  -AM      User Key  (r) = Recorded By, (t) = Taken By, (c) = Cosigned By    Initials Name Provider Type    AM DAVID BillSLP Speech and Language Pathologist                 Time Calculation:   SLP Start Time: 1300    Therapy Charges for Today     Code Description Service Date Service Provider Modifiers Qty    97152303316  ST TREATMENT SPEECH 4 3/13/2018 DAVID BillSLP GN 1                   VANESSA Bill  3/13/2018

## 2018-03-26 ENCOUNTER — HOSPITAL ENCOUNTER (OUTPATIENT)
Dept: SPEECH THERAPY | Facility: HOSPITAL | Age: 68
Setting detail: THERAPIES SERIES
Discharge: HOME | End: 2018-03-26

## 2018-03-26 DIAGNOSIS — R41.3 MEMORY LOSS: Primary | ICD-10-CM

## 2018-03-26 DIAGNOSIS — R41.89 COGNITIVE IMPAIRMENT: ICD-10-CM

## 2018-03-26 PROCEDURE — 92507 TX SP LANG VOICE COMM INDIV: CPT

## 2018-03-26 NOTE — THERAPY TREATMENT NOTE
Outpatient Speech Language Pathology   Adult Speech Language Cognitive Treatment Note  [facility]     Patient Name: Mauricio Barber  : 1950  MRN: [id number]  Today's Date: 3/26/2018         Visit Date: 2018   Patient Active Problem List   Diagnosis   • LOM (left otitis media)   • Minimal cognitive impairment   • Hypertension   • Chest pain   • Hyperlipidemia   • Abdominal pain   • History of prostate cancer   • Angina at rest   • Amnesia   • Lung nodule   • Mild renal insufficiency   • Diarrhea of presumed infectious origin          Visit Dx:    ICD-10-CM ICD-9-CM   1. Memory loss R41.3 780.93   2. Cognitive impairment R41.89 294.9                               SLP OP Goals     Row Name 18 1300          Subjective Comments    Subjective Comments Pt. reports that he is feeling well. No complaints this date.   -AM        Subjective Pain    Able to rate subjective pain? yes  -AM     Pre-Treatment Pain Level 0  -AM     Post-Treatment Pain Level 0  -AM        Other Goals    Other Adult Goal- 1 LTG 1: Pt. will demonstrate improvment in cognitive communication skills with use of compensatory strategies.  -AM     Status: Other Adult Goal- 1 Progressing as expected  -AM     Comments: Other Adult Goal- 1 3/13: Completed initial evaluation for organization, reasoning, and executive functioning. New STGs added.   -AM     Other Adult Goal- 2 STG 1: Pt. will immediately recall/repeat 4 unrelated words with 80% accuracy and one repetition PRN.   -AM     Status: Other Adult Goal- 2 Progress slower than expected  -AM     Comments: Other Adult Goal- 2 3/26: Revised goal from 5 unrelated words to 4 unrelated words. Immediate recall of 5 unrelated words: 0% with repetitions and cues. Immediate recall of 4 unrelated words: 50% with intermittent cues.   -AM     Other Adult Goal- 3 STG 2: Pt. will recall 3 related words post 3 min with 80% accuracy and category cues.  -AM     Status: Other Adult Goal- 3 Progressing as  expected  -AM     Comments: Other Adult Goal- 3 3/26: Recall 3 related words post 3 min: 67% with intermittent cues.   -AM     Other Adult Goal- 4 STG 3: Pt. will recall details from 36-50 wd. paragraphs with 80% accuracy and intermittent cues.   -AM     Status: Other Adult Goal- 4 Progressing as expected  -AM     Comments: Other Adult Goal- 4 3/26: Recall details from 36-50 wd. paragraphs: 58% with intermittent cues.  -AM     Other Adult Goal- 5 STG 4: Pt. will complete divergent naming tasks (15 items/concrete category/1 min) with 80% accuracy and intermittent cues.   -AM     Status: Other Adult Goal- 5 Progressing as expected  -AM     Comments: Other Adult Goal- 5 3/13: Divergent naming: 15 items/concrete/1 min: 100% with intermittent cues.   -AM     Other Adult Goal- 6 STG 5: Pt. will complete ongoing assessment of organization, reasoning, and executive functioning.   -AM     Status: Other Adult Goal- 6 Achieved  -AM     Comments: Other Adult Goal- 6 3/13: COMPLETE and STGs added PRN.  -AM     Other Adult Goal- 7 STG 6: Pt. will complete temporal problems with picture with 80% accuracy and intermittent cues.  -AM     Status: Other Adult Goal- 7 New  -AM     Comments: Other Adult Goal- 7 3/26: Temporal problems with pictures: 60% with intermittent cues. Required extended time to complete each question.   -AM     Other Adult Goal- 8 STG 7: Pt. will complete planning tasks with 80% accuracy and intermittent cues.   -AM     Status: Other Adult Goal- 8 New  -AM     Comments: Other Adult Goal- 8 3/13: STG added  -AM        SLP Time Calculation    SLP Goal Re-Cert Due Date 06/04/18  -AM       User Key  (r) = Recorded By, (t) = Taken By, (c) = Cosigned By    Initials Name Provider Type    AM Domonique Johnson CCC-SLP Speech and Language Pathologist                OP SLP Education     Row Name 03/26/18 1300       Education    Barriers to Learning Hearing deficit  -AM    Education Provided Patient demonstrated recommended  strategies;Patient requires further education on strategies, risks  -AM    Assessed Learning motivation  -AM    Learning Motivation Moderate;Strong  -AM    Learning Method Explanation;Demonstration  -AM    Teaching Response Verbalized understanding;Demonstrated understanding  -AM      User Key  (r) = Recorded By, (t) = Taken By, (c) = Cosigned By    Initials Name Effective Dates    AM VANESSA Bill 04/19/17 -                 OP SLP Assessment/Plan - 03/26/18 1300        SLP Plan    Plan Comments Continue with POC  -AM      User Key  (r) = Recorded By, (t) = Taken By, (c) = Cosigned By    Initials Name Provider Type    AM DAVID BillSLP Speech and Language Pathologist                 Time Calculation:   SLP Start Time: 1300    Therapy Charges for Today     Code Description Service Date Service Provider Modifiers Qty    [id number]  ST TREATMENT SPEECH 4 3/26/2018 Domonique Johnson CCC-SLP GN 1                   VANESSA Bill  3/26/2018

## 2018-04-02 ENCOUNTER — APPOINTMENT (OUTPATIENT)
Dept: SPEECH THERAPY | Facility: HOSPITAL | Age: 68
End: 2018-04-02

## 2018-04-03 ENCOUNTER — HOSPITAL ENCOUNTER (OUTPATIENT)
Dept: SPEECH THERAPY | Facility: HOSPITAL | Age: 68
Setting detail: THERAPIES SERIES
Discharge: HOME | End: 2018-04-03

## 2018-04-03 DIAGNOSIS — R41.3 MEMORY LOSS: Primary | ICD-10-CM

## 2018-04-03 DIAGNOSIS — R41.89 COGNITIVE IMPAIRMENT: ICD-10-CM

## 2018-04-03 PROCEDURE — G0515 COGNITIVE SKILLS DEVELOPMENT: HCPCS

## 2018-04-03 PROCEDURE — G9168 MEMORY CURRENT STATUS: HCPCS

## 2018-04-03 PROCEDURE — G9169 MEMORY GOAL STATUS: HCPCS

## 2018-04-05 ENCOUNTER — TELEPHONE (OUTPATIENT)
Dept: NEUROLOGY | Facility: CLINIC | Age: 68
End: 2018-04-05

## 2018-04-05 NOTE — TELEPHONE ENCOUNTER
Mauricio's wife was called for jury duty.  Mariely states that there is way that she can serve because she can't leave him.   Will you give her a letter excusing her?

## 2018-04-09 ENCOUNTER — HOSPITAL ENCOUNTER (OUTPATIENT)
Dept: SPEECH THERAPY | Facility: HOSPITAL | Age: 68
Setting detail: THERAPIES SERIES
Discharge: HOME | End: 2018-04-09

## 2018-04-09 ENCOUNTER — TELEPHONE (OUTPATIENT)
Dept: NEUROLOGY | Facility: CLINIC | Age: 68
End: 2018-04-09

## 2018-04-09 DIAGNOSIS — R41.3 MEMORY LOSS: Primary | ICD-10-CM

## 2018-04-09 DIAGNOSIS — R41.89 COGNITIVE IMPAIRMENT: ICD-10-CM

## 2018-04-09 PROCEDURE — G0515 COGNITIVE SKILLS DEVELOPMENT: HCPCS

## 2018-04-09 NOTE — TELEPHONE ENCOUNTER
Recommend she speak with PCP about this as they have been seeing/treating him for his cognitive impairement for years

## 2018-04-09 NOTE — THERAPY TREATMENT NOTE
Outpatient Speech Language Pathology   Adult Speech Language Cognitive Treatment Note  [facility]     Patient Name: Mauricio Barber  : 1950  MRN: [id number]  Today's Date: 2018         Visit Date: 2018   Patient Active Problem List   Diagnosis   • LOM (left otitis media)   • Minimal cognitive impairment   • Hypertension   • Chest pain   • Hyperlipidemia   • Abdominal pain   • History of prostate cancer   • Angina at rest   • Amnesia   • Lung nodule   • Mild renal insufficiency   • Diarrhea of presumed infectious origin          Visit Dx:    ICD-10-CM ICD-9-CM   1. Memory loss R41.3 780.93   2. Cognitive impairment R41.89 294.9                               SLP OP Goals     Row Name 18 1300          Subjective Comments    Subjective Comments Pt. reports that he is feeling alright.   -AM        Subjective Pain    Able to rate subjective pain? yes  -AM     Pre-Treatment Pain Level 0  -AM     Post-Treatment Pain Level 0  -AM        Other Goals    Other Adult Goal- 1 LTG 1: Pt. will demonstrate improvment in cognitive communication skills with use of compensatory strategies.  -AM     Status: Other Adult Goal- 1 Progressing as expected  -AM     Comments: Other Adult Goal- 1 3/13: Completed initial evaluation for organization, reasoning, and executive functioning. New STGs added.   -AM     Other Adult Goal- 2 STG 1: Pt. will immediately recall/repeat 4 unrelated words with 80% accuracy and one repetition PRN.   -AM     Status: Other Adult Goal- 2 Progress slower than expected  -AM     Comments: Other Adult Goal- 2 4/3: Immediate recall of 4 unrelated words: 80% with intermittent cues. 3/26: Revised goal from 5 unrelated words to 4 unrelated words. Immediate recall of 5 unrelated words: 0% with repetitions and cues. Immediate recall of 4 unrelated words: 50% with intermittent cues.   -AM     Other Adult Goal- 3 STG 2: Pt. will recall 3 related words post 3 min with 80% accuracy and category cues.   -AM     Status: Other Adult Goal- 3 Progressing as expected  -AM     Comments: Other Adult Goal- 3 4/9: Recall 3 related words post 3 min: 67% with intermittent cues. 3/26: Recall 3 related words post 3 min: 67% with intermittent cues.   -AM     Other Adult Goal- 4 STG 3: Pt. will recall details from 36-50 wd. paragraphs with 80% accuracy and intermittent cues.   -AM     Status: Other Adult Goal- 4 Progressing as expected  -AM     Comments: Other Adult Goal- 4 4/9: Recall details from 1 min news: 54% with cues. 3/26: Recall details from 36-50 wd. paragraphs: 58% with intermittent cues.  -AM     Other Adult Goal- 5 STG 4: Pt. will complete divergent naming tasks (15 items/concrete category/1 min) with 80% accuracy and intermittent cues.   -AM     Status: Other Adult Goal- 5 Progressing as expected  -AM     Comments: Other Adult Goal- 5 4/9: Divergent naming: 15 items/concrete/1 min: 50% with intermittent cues. Perseverations on same items already listed. 4/3: Divergent naming: 15 items/concrete/1 min: 73% with intermittent cues. Easily distracted by personal stories. 3/13: Divergent naming: 15 items/concrete/1 min: 100% with intermittent cues.   -AM     Other Adult Goal- 6 STG 5: Pt. will complete ongoing assessment of organization, reasoning, and executive functioning.   -AM     Status: Other Adult Goal- 6 Achieved  -AM     Comments: Other Adult Goal- 6 3/13: COMPLETE and STGs added PRN.  -AM     Other Adult Goal- 7 STG 6: Pt. will complete temporal problems with picture with 80% accuracy and intermittent cues.  -AM     Status: Other Adult Goal- 7 Progressing as expected  -AM     Comments: Other Adult Goal- 7 4/9: Temporal problems (simple, verbal): 50% with cues and clock. 4/3: Temporal Problems with pictures: 60% with intermittent cues. 3/26: Temporal problems with pictures: 60% with intermittent cues. Required extended time to complete each question.   -AM     Other Adult Goal- 8 STG 7: Pt. will complete planning  tasks with 80% accuracy and intermittent cues.   -AM     Status: Other Adult Goal- 8 New  -AM     Comments: Other Adult Goal- 8 3/13: STG added  -AM        SLP Time Calculation    SLP Goal Re-Cert Due Date 06/04/18  -AM       User Key  (r) = Recorded By, (t) = Taken By, (c) = Cosigned By    Initials Name Provider Type    AM VANESSA Bill Speech and Language Pathologist                OP SLP Education     Row Name 04/09/18 1300       Education    Barriers to Learning Hearing deficit  -AM    Education Provided Patient demonstrated recommended strategies;Patient requires further education on strategies, risks  -AM    Assessed Learning motivation  -AM    Learning Motivation Moderate;Strong  -AM    Learning Method Explanation;Demonstration  -AM    Teaching Response Verbalized understanding;Demonstrated understanding  -AM      User Key  (r) = Recorded By, (t) = Taken By, (c) = Cosigned By    Initials Name Effective Dates    AM VANESSA Bill 04/03/18 -                 OP SLP Assessment/Plan - 04/09/18 1300        SLP Plan    Plan Comments Continue with POC  -AM      User Key  (r) = Recorded By, (t) = Taken By, (c) = Cosigned By    Initials Name Provider Type    AM VANESSA Bill Speech and Language Pathologist                 Time Calculation:   SLP Start Time: 1300    Therapy Charges for Today     Code Description Service Date Service Provider Modifiers Qty    [id number]  ST DEV OF COGN SKILLS EACH 15 MIN 4/9/2018 VANESSA Bill  4                   VANESSA Bill  4/9/2018

## 2018-04-11 ENCOUNTER — TRANSCRIBE ORDERS (OUTPATIENT)
Dept: ADMINISTRATIVE | Facility: HOSPITAL | Age: 68
End: 2018-04-11

## 2018-04-11 DIAGNOSIS — Z87.891 PERSONAL HISTORY OF TOBACCO USE, PRESENTING HAZARDS TO HEALTH: Primary | ICD-10-CM

## 2018-04-16 ENCOUNTER — HOSPITAL ENCOUNTER (OUTPATIENT)
Dept: SPEECH THERAPY | Facility: HOSPITAL | Age: 68
Setting detail: THERAPIES SERIES
Discharge: HOME | End: 2018-04-16

## 2018-04-16 DIAGNOSIS — R41.89 COGNITIVE IMPAIRMENT: ICD-10-CM

## 2018-04-16 DIAGNOSIS — R41.3 MEMORY LOSS: Primary | ICD-10-CM

## 2018-04-16 PROCEDURE — G0515 COGNITIVE SKILLS DEVELOPMENT: HCPCS

## 2018-04-16 NOTE — THERAPY TREATMENT NOTE
Outpatient Speech Language Pathology   Adult Speech Language Cognitive Treatment Note  [facility]     Patient Name: Mauricio Barber  : 1950  MRN: [id number]  Today's Date: 2018         Visit Date: 2018   Patient Active Problem List   Diagnosis   • LOM (left otitis media)   • Minimal cognitive impairment   • Hypertension   • Chest pain   • Hyperlipidemia   • Abdominal pain   • History of prostate cancer   • Angina at rest   • Amnesia   • Lung nodule   • Mild renal insufficiency   • Diarrhea of presumed infectious origin          Visit Dx:    ICD-10-CM ICD-9-CM   1. Memory loss R41.3 780.93   2. Cognitive impairment R41.89 294.9                               SLP OP Goals     Row Name 18 1321 18 1300       Subjective Comments    Subjective Comments  -- Pt. reports that he has been well. No complaints this date. He mentioned throughout the session that he was having a hard time thinking and completing the activities today.   -AM       Subjective Pain    Able to rate subjective pain?  -- yes  -AM    Pre-Treatment Pain Level  -- 0  -AM    Post-Treatment Pain Level  -- 0  -AM       Other Goals    Other Adult Goal- 1  -- LTG 1: Pt. will demonstrate improvment in cognitive communication skills with use of compensatory strategies.  -AM    Status: Other Adult Goal- 1  -- Progressing as expected  -AM    Comments: Other Adult Goal- 1  -- 3/13: Completed initial evaluation for organization, reasoning, and executive functioning. New STGs added.   -AM    Other Adult Goal- 2  -- STG 1: Pt. will immediately recall/repeat 4 unrelated words with 80% accuracy and one repetition PRN.   -AM    Status: Other Adult Goal- 2  -- Progress slower than expected  -AM    Comments: Other Adult Goal- 2  -- : Repeat 4 unrelated words: 60% with 1 repetition PRN. 4/3: Immediate recall of 4 unrelated words: 80% with intermittent cues. 3/26: Revised goal from 5 unrelated words to 4 unrelated words. Immediate recall of  5 unrelated words: 0% with repetitions and cues. Immediate recall of 4 unrelated words: 50% with intermittent cues.   -AM    Other Adult Goal- 3  -- STG 2: Pt. will recall 3 related words post 3 min with 80% accuracy and category cues.  -AM    Status: Other Adult Goal- 3  -- Progressing as expected  -AM    Comments: Other Adult Goal- 3  -- 4/16: Recall 3 unrelated words post 3 min: 67% with intermittent cues. 4/9: Recall 3 related words post 3 min: 67% with intermittent cues. 3/26: Recall 3 related words post 3 min: 67% with intermittent cues.   -AM    Other Adult Goal- 4  -- STG 3: Pt. will recall details from 36-50 wd. paragraphs with 80% accuracy and intermittent cues.   -AM    Status: Other Adult Goal- 4  -- Progressing as expected  -AM    Comments: Other Adult Goal- 4  -- 4/9: Recall details from 1 min news: 54% with cues. 3/26: Recall details from 36-50 wd. paragraphs: 58% with intermittent cues.  -AM    Other Adult Goal- 5  -- STG 4: Pt. will complete divergent naming tasks (15 items/concrete category/1 min) with 80% accuracy and intermittent cues.   -AM    Status: Other Adult Goal- 5  -- Progressing as expected  -AM    Comments: Other Adult Goal- 5  -- 4/9: Divergent naming: 15 items/concrete/1 min: 50% with intermittent cues. Perseverations on same items already listed. 4/3: Divergent naming: 15 items/concrete/1 min: 73% with intermittent cues. Easily distracted by personal stories. 3/13: Divergent naming: 15 items/concrete/1 min: 100% with intermittent cues.   -AM    Other Adult Goal- 6  -- STG 5: Pt. will complete ongoing assessment of organization, reasoning, and executive functioning.   -AM    Status: Other Adult Goal- 6  -- Achieved  -AM    Comments: Other Adult Goal- 6  -- 3/13: COMPLETE and STGs added PRN.  -AM    Other Adult Goal- 7  -- STG 6: Pt. will complete temporal problems with pictures with 80% accuracy and intermittent cues.  -AM    Status: Other Adult Goal- 7  -- Progressing as expected   -AM    Comments: Other Adult Goal- 7  -- 4/16: Temporal problems with pictures: 40% with intermittent cues. Required mod A and use of learning clock to work through problems. 4/9: Temporal problems (simple, verbal): 50% with cues and clock. 4/3: Temporal Problems with pictures: 60% with intermittent cues.   -AM    Other Adult Goal- 8  -- STG 7: Pt. will complete planning tasks with 80% accuracy and intermittent cues.   -AM    Status: Other Adult Goal- 8  -- Progressing as expected  -AM    Comments: Other Adult Goal- 8  -- 4/16: Simple planning task: 20% with intermittent cues. Required mod A to complete.   -AM       SLP Time Calculation    SLP Goal Re-Cert Due Date 04/16/18  -AM 06/04/18  -AM      User Key  (r) = Recorded By, (t) = Taken By, (c) = Cosigned By    Initials Name Provider Type    AM Domonique Johnson CCC-SLP Speech and Language Pathologist                OP SLP Education     Row Name 04/16/18 1300       Education    Barriers to Learning Hearing deficit  -AM    Action Taken to Address Barriers Louder presentation  -AM    Education Provided Patient demonstrated recommended strategies;Patient requires further education on strategies, risks  -AM    Assessed Learning motivation  -AM    Learning Motivation Strong  -AM    Learning Method Explanation;Demonstration  -AM    Teaching Response Verbalized understanding;Demonstrated understanding  -AM      User Key  (r) = Recorded By, (t) = Taken By, (c) = Cosigned By    Initials Name Effective Dates    AM DAVID BillSLP 04/03/18 -                 OP SLP Assessment/Plan - 04/16/18 1300        SLP Plan    Plan Comments Continue with POC  -AM      User Key  (r) = Recorded By, (t) = Taken By, (c) = Cosigned By    Initials Name Provider Type    AM Domonique Johnson CCC-SLP Speech and Language Pathologist                 Time Calculation:   SLP Start Time: 1300    Therapy Charges for Today     Code Description Service Date Service Provider Modifiers Qty     [id number] [facility] DEV OF COGN SKILLS EACH 15 MIN 4/16/2018 Domonique Johnson CCC-SLP  4                   DAVID BillSLP  4/16/2018

## 2018-04-19 ENCOUNTER — HOSPITAL ENCOUNTER (OUTPATIENT)
Dept: ULTRASOUND IMAGING | Facility: HOSPITAL | Age: 68
Discharge: HOME | End: 2018-04-19
Attending: FAMILY MEDICINE | Admitting: FAMILY MEDICINE

## 2018-04-19 DIAGNOSIS — Z87.891 PERSONAL HISTORY OF TOBACCO USE, PRESENTING HAZARDS TO HEALTH: ICD-10-CM

## 2018-04-19 PROCEDURE — 76706 US ABDL AORTA SCREEN AAA: CPT

## 2018-04-23 ENCOUNTER — APPOINTMENT (OUTPATIENT)
Dept: SPEECH THERAPY | Facility: HOSPITAL | Age: 68
End: 2018-04-23

## 2018-04-30 ENCOUNTER — HOSPITAL ENCOUNTER (OUTPATIENT)
Dept: SPEECH THERAPY | Facility: HOSPITAL | Age: 68
Setting detail: THERAPIES SERIES
Discharge: HOME | End: 2018-04-30

## 2018-04-30 DIAGNOSIS — R41.89 COGNITIVE IMPAIRMENT: ICD-10-CM

## 2018-04-30 DIAGNOSIS — R41.3 MEMORY LOSS: Primary | ICD-10-CM

## 2018-04-30 PROCEDURE — G9168 MEMORY CURRENT STATUS: HCPCS

## 2018-04-30 PROCEDURE — G9169 MEMORY GOAL STATUS: HCPCS

## 2018-04-30 PROCEDURE — G0515 COGNITIVE SKILLS DEVELOPMENT: HCPCS

## 2018-05-07 ENCOUNTER — APPOINTMENT (OUTPATIENT)
Dept: SPEECH THERAPY | Facility: HOSPITAL | Age: 68
End: 2018-05-07

## 2018-05-08 ENCOUNTER — HOSPITAL ENCOUNTER (OUTPATIENT)
Dept: SPEECH THERAPY | Facility: HOSPITAL | Age: 68
Setting detail: THERAPIES SERIES
Discharge: HOME | End: 2018-05-08

## 2018-05-08 DIAGNOSIS — R41.89 COGNITIVE IMPAIRMENT: ICD-10-CM

## 2018-05-08 DIAGNOSIS — R41.3 MEMORY LOSS: Primary | ICD-10-CM

## 2018-05-08 PROCEDURE — G0515 COGNITIVE SKILLS DEVELOPMENT: HCPCS

## 2018-05-08 NOTE — THERAPY TREATMENT NOTE
Outpatient Speech Language Pathology   Adult Speech Language Cognitive Treatment Note  [facility]     Patient Name: Mauricio Barber  : 1950  MRN: [id number]  Today's Date: 2018         Visit Date: 2018   Patient Active Problem List   Diagnosis   • LOM (left otitis media)   • Minimal cognitive impairment   • Hypertension   • Chest pain   • Hyperlipidemia   • Abdominal pain   • History of prostate cancer   • Angina at rest   • Amnesia   • Lung nodule   • Mild renal insufficiency   • Diarrhea of presumed infectious origin          Visit Dx:    ICD-10-CM ICD-9-CM   1. Memory loss R41.3 780.93   2. Cognitive impairment R41.89 294.9                               SLP OP Goals     Row Name 18 0900          Subjective Comments    Subjective Comments Pt. reports that he is feeling well this morning. He does report ringing in his ears.  -AM        Subjective Pain    Able to rate subjective pain? yes  -AM     Pre-Treatment Pain Level 0  -AM     Post-Treatment Pain Level 0  -AM        Other Goals    Other Adult Goal- 1 LTG 1: Pt. will demonstrate improvment in cognitive communication skills with use of compensatory strategies.  -AM     Status: Other Adult Goal- 1 Progressing as expected  -AM     Comments: Other Adult Goal- 1 : Pt. continues to required mod cues for memory tasks and min-mod cues for other cognitive-communication tasks. 3/13: Completed initial evaluation for organization, reasoning, and executive functioning. New STGs added.   -AM     Other Adult Goal- 2 STG 1: Pt. will immediately recall/repeat 4 unrelated words with 80% accuracy and one repetition PRN.   -AM     Status: Other Adult Goal- 2 Progress slower than expected  -AM     Comments: Other Adult Goal- 2 : Immediate recall of 4 unrelated words: 90% without repetition. : Repeat 4 unrelated words: 60% with 1 repetition PRN. 4/3: Immediate recall of 4 unrelated words: 80% with intermittent cues. 3/26: Revised goal from 5  unrelated words to 4 unrelated words. Immediate recall of 5 unrelated words: 0% with repetitions and cues. Immediate recall of 4 unrelated words: 50% with intermittent cues.   -AM     Other Adult Goal- 3 STG 2: Pt. will recall 3 related words post 3 min with 80% accuracy and category cues.  -AM     Status: Other Adult Goal- 3 Progressing as expected  -AM     Comments: Other Adult Goal- 3 4/30: Recall 3 unrelated words post 3 min: 42% with intermittent cues. 4/16: Recall 3 unrelated words post 3 min: 67% with intermittent cues. 4/9: Recall 3 related words post 3 min: 67% with intermittent cues. 3/26: Recall 3 related words post 3 min: 67% with intermittent cues.   -AM     Other Adult Goal- 4 STG 3: Pt. will recall details from 22-36 wd. paragraphs with 80% accuracy and intermittent cues.   -AM     Status: Other Adult Goal- 4 Revised;Progress slower than expected  -AM     Comments: Other Adult Goal- 4 5/8: Recall details from 36-50 wd. paragraphs: 43% with cues, 22-36 wd. paragraphs: 67% withintermittent cues. revised goal to 22-36 wd. paragraphs. 4/30: Recall details from 36-50 wd. paragraphs: 44% with intermittent cues. 4/9: Recall details from 1 min news: 54% with cues. 3/26: Recall details from 36-50 wd. paragraphs: 58% with intermittent cues.  -AM     Other Adult Goal- 5 STG 4: Pt. will complete divergent naming tasks (15 items/concrete category/1 min) with 80% accuracy and intermittent cues.   -AM     Status: Other Adult Goal- 5 Progressing as expected  -AM     Comments: Other Adult Goal- 5 5/8: Divergent naming: 15 items/concrete/1 min: 77% with intermittent cues. 4/9: Divergent naming: 15 items/concrete/1 min: 50% with intermittent cues. Perseverations on same items already listed. 4/3: Divergent naming: 15 items/concrete/1 min: 73% with intermittent cues. Easily distracted by personal stories. 3/13: Divergent naming: 15 items/concrete/1 min: 100% with intermittent cues.   -AM     Other Adult Goal- 6 STG 5:  Pt. will complete ongoing assessment of organization, reasoning, and executive functioning.   -AM     Status: Other Adult Goal- 6 Achieved  -AM     Comments: Other Adult Goal- 6 3/13: COMPLETE and STGs added PRN.  -AM     Other Adult Goal- 7 STG 6: Pt. will complete temporal problems with pictures with 80% accuracy and intermittent cues.  -AM     Status: Other Adult Goal- 7 Progressing as expected  -AM     Comments: Other Adult Goal- 7 5/8: Temporal problems with pictures: 40% with intermittent cues, 80% with min-mod cues. 4/16: Temporal problems with pictures: 40% with intermittent cues. Required mod A and use of learning clock to work through problems. 4/9: Temporal problems (simple, verbal): 50% with cues and clock. 4/3: Temporal Problems with pictures: 60% with intermittent cues.   -AM     Other Adult Goal- 8 STG 7: Pt. will complete planning tasks with 80% accuracy and intermittent cues.   -AM     Status: Other Adult Goal- 8 Progressing as expected  -AM     Comments: Other Adult Goal- 8 5/8: 10 step written sequence: 90% with intermittent cues. Cut steps apart and pt. manually organized them. 4/30: Paragraph completion with word bank: 73% with intermittent cues. 4/16: Simple planning task: 20% with intermittent cues. Required mod A to complete.   -AM        SLP Time Calculation    SLP Goal Re-Cert Due Date 06/04/18  -AM       User Key  (r) = Recorded By, (t) = Taken By, (c) = Cosigned By    Initials Name Provider Type    AM Domonique Johnson CCC-SLP Speech and Language Pathologist                OP SLP Education     Row Name 05/08/18 0900       Education    Barriers to Learning Hearing deficit  -AM    Education Provided Patient demonstrated recommended strategies;Patient requires further education on strategies, risks  -AM    Assessed Learning motivation  -AM    Learning Motivation Strong  -AM    Learning Method Explanation;Demonstration  -AM    Teaching Response Verbalized understanding;Demonstrated  understanding  -AM      User Key  (r) = Recorded By, (t) = Taken By, (c) = Cosigned By    Initials Name Effective Dates    AM VANESSA Bill 04/03/18 -                 OP SLP Assessment/Plan - 05/08/18 0900        SLP Plan    Plan Comments Continue with POC  -AM      User Key  (r) = Recorded By, (t) = Taken By, (c) = Cosigned By    Initials Name Provider Type    AM Domonique Johnson CCC-SLP Speech and Language Pathologist                 Time Calculation:   SLP Start Time: 0900    Therapy Charges for Today     Code Description Service Date Service Provider Modifiers Qty    [id number] [facility] DEV OF COGN SKILLS EACH 15 MIN 5/8/2018 Domonique Johnson CCC-SLP  4                   VANESSA Bill  5/8/2018

## 2018-05-14 ENCOUNTER — APPOINTMENT (OUTPATIENT)
Dept: SPEECH THERAPY | Facility: HOSPITAL | Age: 68
End: 2018-05-14

## 2018-05-15 ENCOUNTER — HOSPITAL ENCOUNTER (OUTPATIENT)
Dept: SPEECH THERAPY | Facility: HOSPITAL | Age: 68
Setting detail: THERAPIES SERIES
Discharge: HOME | End: 2018-05-15

## 2018-05-15 DIAGNOSIS — R41.3 MEMORY LOSS: ICD-10-CM

## 2018-05-15 DIAGNOSIS — R41.89 COGNITIVE IMPAIRMENT: Primary | ICD-10-CM

## 2018-05-15 PROCEDURE — 92507 TX SP LANG VOICE COMM INDIV: CPT

## 2018-05-15 NOTE — THERAPY TREATMENT NOTE
Outpatient Speech Language Pathology   Adult Speech Language Cognitive Treatment Note  [facility]     Patient Name: Mauricio Barber  : 1950  MRN: [id number]  Today's Date: 5/15/2018         Visit Date: 05/15/2018   Patient Active Problem List   Diagnosis   • LOM (left otitis media)   • Minimal cognitive impairment   • Hypertension   • Chest pain   • Hyperlipidemia   • Abdominal pain   • History of prostate cancer   • Angina at rest   • Amnesia   • Lung nodule   • Mild renal insufficiency   • Diarrhea of presumed infectious origin          Visit Dx:    ICD-10-CM ICD-9-CM   1. Cognitive impairment R41.89 294.9   2. Memory loss R41.3 780.93                               SLP OP Goals     Row Name 05/15/18 0900          Subjective Comments    Subjective Comments Pt. reports that his tinnitus is worse than usual today. he also has back pain from mowing this weekend.  -AM        Subjective Pain    Able to rate subjective pain? yes  -AM     Pre-Treatment Pain Level 6  -AM     Post-Treatment Pain Level 6  -AM     Subjective Pain Comment Hip, low back pain  -AM        Other Goals    Other Adult Goal- 1 LTG 1: Pt. will demonstrate improvment in cognitive communication skills with use of compensatory strategies.  -AM     Status: Other Adult Goal- 1 Progressing as expected  -AM     Comments: Other Adult Goal- 1 : Pt. continues to required mod cues for memory tasks and min-mod cues for other cognitive-communication tasks. 3/13: Completed initial evaluation for organization, reasoning, and executive functioning. New STGs added.   -AM     Other Adult Goal- 2 STG 1: Pt. will immediately recall/repeat 4 unrelated words with 80% accuracy and one repetition PRN.   -AM     Status: Other Adult Goal- 2 Progress slower than expected  -AM     Comments: Other Adult Goal- 2 : Immediate recall of 4 unrelated words: 90% without repetition. : Repeat 4 unrelated words: 60% with 1 repetition PRN. 4/3: Immediate recall of 4  unrelated words: 80% with intermittent cues. 3/26: Revised goal from 5 unrelated words to 4 unrelated words. Immediate recall of 5 unrelated words: 0% with repetitions and cues. Immediate recall of 4 unrelated words: 50% with intermittent cues.   -AM     Other Adult Goal- 3 STG 2: Pt. will recall 3 related words post 3 min with 80% accuracy and category cues.  -AM     Status: Other Adult Goal- 3 Progressing as expected  -AM     Comments: Other Adult Goal- 3 5/15: Recall 3 related words post 3 min: 78% with category cues PRN. 4/30: Recall 3 unrelated words post 3 min: 42% with intermittent cues. 4/16: Recall 3 unrelated words post 3 min: 67% with intermittent cues. 4/9: Recall 3 related words post 3 min: 67% with intermittent cues. 3/26: Recall 3 related words post 3 min: 67% with intermittent cues.   -AM     Other Adult Goal- 4 STG 3: Pt. will recall details from 22-36 wd. paragraphs with 80% accuracy and intermittent cues.   -AM     Status: Other Adult Goal- 4 Revised;Progressing as expected  -AM     Comments: Other Adult Goal- 4   5/15: Recall details from 22-36 wd. paragraphs: 86% with intermittent cues. 5/8: Recall details from 36-50 wd. paragraphs: 43% with cues, 22-36 wd. paragraphs: 67% withintermittent cues. revised goal to 22-36 wd. paragraphs. 4/30: Recall details from 36-50 wd. paragraphs: 44% with intermittent cues.   -AM     Other Adult Goal- 5 STG 4: Pt. will complete divergent naming tasks (15 items/concrete category/1 min) with 80% accuracy and intermittent cues.   -AM     Status: Other Adult Goal- 5 Progressing as expected  -AM     Comments: Other Adult Goal- 5 5/8: Divergent naming: 15 items/concrete/1 min: 77% with intermittent cues. 4/9: Divergent naming: 15 items/concrete/1 min: 50% with intermittent cues. Perseverations on same items already listed. 4/3: Divergent naming: 15 items/concrete/1 min: 73% with intermittent cues. Easily distracted by personal stories. 3/13: Divergent naming: 15  items/concrete/1 min: 100% with intermittent cues.   -AM     Other Adult Goal- 6 STG 5: Pt. will complete ongoing assessment of organization, reasoning, and executive functioning.   -AM     Status: Other Adult Goal- 6 Achieved  -AM     Comments: Other Adult Goal- 6 3/13: COMPLETE and STGs added PRN.  -AM     Other Adult Goal- 7 STG 6: Pt. will complete temporal problems with pictures with 80% accuracy and intermittent cues.  -AM     Status: Other Adult Goal- 7 Progressing as expected  -AM     Comments: Other Adult Goal- 7 5/15: Temporal problems with pictures: 20% with intermittent cues. Required extended time and max cues. Unable to concentrate to complete 5 questions. 5/8: Temporal problems with pictures: 40% with intermittent cues, 80% with min-mod cues. 4/16: Temporal problems with pictures: 40% with intermittent cues. Required mod A and use of learning clock to work through problems. 4/9: Temporal problems (simple, verbal): 50% with cues and clock. 4/3: Temporal Problems with pictures: 60% with intermittent cues.   -AM     Other Adult Goal- 8 STG 7: Pt. will complete planning tasks with 80% accuracy and intermittent cues.   -AM     Status: Other Adult Goal- 8 Progressing as expected  -AM     Comments: Other Adult Goal- 8 5/8: 10 step written sequence: 90% with intermittent cues. Cut steps apart and pt. manually organized them. 4/30: Paragraph completion with word bank: 73% with intermittent cues. 4/16: Simple planning task: 20% with intermittent cues. Required mod A to complete.   -AM        SLP Time Calculation    SLP Goal Re-Cert Due Date 06/04/18  -AM       User Key  (r) = Recorded By, (t) = Taken By, (c) = Cosigned By    Initials Name Provider Type    AM Domonique Johnson CCC-SLP Speech and Language Pathologist                OP SLP Education     Row Name 05/15/18 0900       Education    Barriers to Learning Hearing deficit  -AM    Action Taken to Address Barriers Bilateral HAs  -AM    Education Provided  Patient demonstrated recommended strategies;Patient requires further education on strategies, risks  -AM    Assessed Learning motivation  -AM    Learning Motivation Strong  -AM    Learning Method Demonstration;Explanation  -AM    Teaching Response Demonstrated understanding;Verbalized understanding  -AM      User Key  (r) = Recorded By, (t) = Taken By, (c) = Cosigned By    Initials Name Effective Dates    AM VANESSA Bill 04/03/18 -                 OP SLP Assessment/Plan - 05/15/18 0900        SLP Plan    Plan Comments Continue with POC  -AM      User Key  (r) = Recorded By, (t) = Taken By, (c) = Cosigned By    Initials Name Provider Type    AM DAVID BillSLP Speech and Language Pathologist                 Time Calculation:   SLP Start Time: 0910    Therapy Charges for Today     Code Description Service Date Service Provider Modifiers Qty    [id number]  ST TREATMENT SPEECH 3 5/15/2018 DAVID BillSLP GN 1                   VANESSA Bill  5/15/2018

## 2018-05-21 ENCOUNTER — OFFICE VISIT (OUTPATIENT)
Dept: NEUROLOGY | Facility: CLINIC | Age: 68
End: 2018-05-21

## 2018-05-21 VITALS
DIASTOLIC BLOOD PRESSURE: 64 MMHG | WEIGHT: 211 LBS | SYSTOLIC BLOOD PRESSURE: 108 MMHG | BODY MASS INDEX: 28.58 KG/M2 | HEIGHT: 72 IN | HEART RATE: 55 BPM | OXYGEN SATURATION: 98 %

## 2018-05-21 DIAGNOSIS — F03.90 DEMENTIA WITHOUT BEHAVIORAL DISTURBANCE, UNSPECIFIED DEMENTIA TYPE: Primary | ICD-10-CM

## 2018-05-21 PROCEDURE — 99212 OFFICE O/P EST SF 10 MIN: CPT | Performed by: NURSE PRACTITIONER

## 2018-05-21 RX ORDER — UBIDECARENONE 75 MG
50 CAPSULE ORAL DAILY
COMMUNITY

## 2018-05-21 RX ORDER — TERBINAFINE HYDROCHLORIDE 250 MG/1
250 TABLET ORAL DAILY
Refills: 0 | COMMUNITY
Start: 2018-03-19 | End: 2018-07-06

## 2018-05-21 NOTE — PROGRESS NOTES
Subjective:     Patient ID: Mauricio Barber is a 68 y.o. male.    CC:   Chief Complaint   Patient presents with   • Memory Loss       HPI:   History of Present Illness     Mr. Barber is here today for follow-up.  When I saw him last he had been followed by his PCP for several years for dementia.  He first saw Dr. Mcgregor in 2014 and was at that time started on Aricept.  PCP has been managing his dementia until recently.  He's been managed with an episode as well as Namenda both at 10 mg.  When I saw him last he and his wife had reported episodes of fatigue with staring spells, he had had an MRI of his brain in January that was unremarkable outside of cerebral atrophy.  I did order an EEG which showed mild generalized slowing with no epileptiform activity.  They tell me today he has had no further episodes, they contribute these spells to timing of his Lupron injections.  He had cardiac workup July 2017 when he first began to notice the spells, workup was essentially unremarkable.  Wife is a bit concerned as memory does seem to be worsening.  Mr. Barber's still very active, he works around the house and walks on the treadmill daily.  He has episodic periods of confusion, he does not drive and has not for years.  He does not attempt to leave the home, no behavioral disturbances reported.  He is able to care for himself with ADLs.  The following portions of the patient's history were reviewed and updated as appropriate: allergies, current medications, past family history, past medical history, past social history, past surgical history and problem list.    Past Medical History:   Diagnosis Date   • Alzheimer disease    • Cancer    • Colon polyp    • Depression    • High cholesterol    • Hyperlipidemia    • Hypertension    • Prostate cancer        Past Surgical History:   Procedure Laterality Date   • PROSTATE SURGERY     • PROSTATE SURGERY         Social History     Social History   • Marital status:       Spouse name: N/A   • Number of children: N/A   • Years of education: N/A     Occupational History   • Not on file.     Social History Main Topics   • Smoking status: Former Smoker   • Smokeless tobacco: Not on file   • Alcohol use No   • Drug use: No   • Sexual activity: Defer     Other Topics Concern   • Not on file     Social History Narrative   • No narrative on file       Family History   Problem Relation Age of Onset   • Cancer Mother    • Heart disease Father    • No Known Problems Brother    • Hypothyroidism Daughter    • No Known Problems Sister    • Cancer Brother         Prostate        Review of Systems   Constitutional: Negative for activity change, fatigue and fever.   HENT: Negative for drooling, tinnitus and trouble swallowing.    Eyes: Negative.    Respiratory: Negative.    Cardiovascular: Negative.    Gastrointestinal: Negative.    Endocrine: Positive for heat intolerance.   Genitourinary: Positive for frequency (related to prostate cancer, currently under the care of urology).   Musculoskeletal: Negative.    Skin: Negative.    Allergic/Immunologic: Negative.    Neurological: Positive for weakness (this has not occurred since he was seen last, they have related this to occurring after Lupron injections) and headaches (much improved, occur after Lupron injection). Negative for light-headedness.   Hematological: Negative.    Psychiatric/Behavioral: Positive for confusion ( history of dementia and dating back to at least 2014).        Objective:    Neurologic Exam     Mental Status   Oriented to person.   Oriented to place. Oriented to country.   Disoriented to year, month and date. Oriented to season.   Registration: recalls 2 of 3 objects. Recall at 5 minutes: recalls 1 of 3 objects. Follows 3 step commands.   Attention: normal. Concentration: normal.   Speech: speech is normal   Level of consciousness: alert  Knowledge: consistent with education.   Able to name object. Able to read. Able to write.      Cranial Nerves   Cranial nerves II through XII intact.     CN III, IV, VI   Pupils are equal, round, and reactive to light.  Extraocular motions are normal.     Motor Exam   Muscle bulk: normal  Overall muscle tone: normal  Right arm pronator drift: absent  Left arm pronator drift: absent    Strength   Strength 5/5 throughout.     Sensory Exam   Light touch normal.     Gait, Coordination, and Reflexes     Gait  Gait: normal    Coordination   Romberg: negative  Finger to nose coordination: normal    Tremor   Resting tremor: absent  Intention tremor: absent  Action tremor: absent    Reflexes   Right brachioradialis: 2+  Left brachioradialis: 2+  Right biceps: 2+  Left biceps: 2+  Right triceps: 2+  Left triceps: 2+  Right patellar: 2+  Left patellar: 2+  Right achilles: 2+  Left achilles: 2+  Right : 2+  Left : 2+  Right plantar: normal  Left plantar: normal  Right Becker: absent  Left Becker: absent      Physical Exam   Constitutional: He appears well-developed and well-nourished. No distress.   HENT:   Head: Normocephalic.   Eyes: EOM are normal. Pupils are equal, round, and reactive to light.   Neck: Normal range of motion. Neck supple.   Cardiovascular: Normal rate.    Pulmonary/Chest: Effort normal. No respiratory distress.   Neurological: He is alert. He has normal strength. He displays normal reflexes. No cranial nerve deficit or sensory deficit. He exhibits normal muscle tone. He has a normal Finger-Nose-Finger Test and a normal Romberg Test. Gait normal. Coordination normal.   Reflex Scores:       Tricep reflexes are 2+ on the right side and 2+ on the left side.       Bicep reflexes are 2+ on the right side and 2+ on the left side.       Brachioradialis reflexes are 2+ on the right side and 2+ on the left side.       Patellar reflexes are 2+ on the right side and 2+ on the left side.       Achilles reflexes are 2+ on the right side and 2+ on the left side.  Psychiatric: He has a normal mood and  affect. His speech is normal and behavior is normal. Judgment normal.   Vitals reviewed.      Assessment/Plan:       Mauricio was seen today for memory loss.    Diagnoses and all orders for this visit:    Dementia without behavioral disturbance, unspecified dementia type  -     Ambulatory Referral to Neurology    Mr. Barber has dementia with decline in memory in spite of medications as reported by wife.  MMSE 3 months ago 21, down to 19 today, 1/3 recall.  His wife is interested in referral to [facility] or someone specifically for memory.  I will have Dr. Coley see him for opinion, they are agreeable to this plan.  Mr. Barber is very active and fit gentleman, he works on his farm, his wife tells me that their children are very insistent they sell their home and move into a smaller condo and they are hesitant to do this.  He still care for himself, has episodes of confusion.  Wife cares for him at home and he does not drive.  I do think it would be of benefit for him to see the memory clinic, they're interested in any type of research that they may be able to participate in, I've advised them I'm and sure what type of studies are going on at this time they can discuss this further at the memory clinic  Reviewed medications, potential side effects and signs and symptoms to report. Discussed risk versus benefits of treatment plan with patient and/or family-including medications, labs and radiology that may be ordered. Addressed questions and concerns during visit. Patient and/or family verbalized understanding and agree with plan.  EMR Dragon/Transcription Disclaimer:  Much of this encounter note is an electronic transcription of spoken language to printed text. Electronic transcription of spoken language may permit erroneous words or phrases to be inadvertently transcribed. Although I have reviewed the note for such errors, some may still exist in this documentation.           Luzmaria Sosa,  APRN  5/21/2018

## 2018-06-18 ENCOUNTER — APPOINTMENT (OUTPATIENT)
Dept: SPEECH THERAPY | Facility: HOSPITAL | Age: 68
End: 2018-06-18

## 2018-06-25 ENCOUNTER — APPOINTMENT (OUTPATIENT)
Dept: SPEECH THERAPY | Facility: HOSPITAL | Age: 68
End: 2018-06-25

## 2018-07-02 ENCOUNTER — DOCUMENTATION (OUTPATIENT)
Dept: SPEECH THERAPY | Facility: HOSPITAL | Age: 68
End: 2018-07-02

## 2018-07-02 DIAGNOSIS — R41.89 COGNITIVE IMPAIRMENT: ICD-10-CM

## 2018-07-02 DIAGNOSIS — R41.3 MEMORY LOSS: Primary | ICD-10-CM

## 2018-07-02 NOTE — THERAPY DISCHARGE NOTE
Speech Language Pathology Discharge Summary         Patient Name: Mauricio Barber  : 1950  MRN: [id number]    Today's Date: 2018            SLP OP Goals     Row Name 18 0900          Other Goals    Other Adult Goal- 1 LTG 1: Pt. will demonstrate improvment in cognitive communication skills with use of compensatory strategies.  -AM     Status: Other Adult Goal- 1 Discontinued  -AM     Comments: Other Adult Goal- 1 : Pt. continues to required mod cues for memory tasks and min-mod cues for other cognitive-communication tasks. 3/13: Completed initial evaluation for organization, reasoning, and executive functioning. New STGs added.   -AM     Other Adult Goal- 2 STG 1: Pt. will immediately recall/repeat 4 unrelated words with 80% accuracy and one repetition PRN.   -AM     Status: Other Adult Goal- 2 Discontinued  -AM     Comments: Other Adult Goal- 2 : Immediate recall of 4 unrelated words: 90% without repetition. : Repeat 4 unrelated words: 60% with 1 repetition PRN. 4/3: Immediate recall of 4 unrelated words: 80% with intermittent cues. 3/26: Revised goal from 5 unrelated words to 4 unrelated words. Immediate recall of 5 unrelated words: 0% with repetitions and cues. Immediate recall of 4 unrelated words: 50% with intermittent cues.   -AM     Other Adult Goal- 3 STG 2: Pt. will recall 3 related words post 3 min with 80% accuracy and category cues.  -AM     Status: Other Adult Goal- 3 Discontinued  -AM     Comments: Other Adult Goal- 3 5/15: Recall 3 related words post 3 min: 78% with category cues PRN. : Recall 3 unrelated words post 3 min: 42% with intermittent cues. : Recall 3 unrelated words post 3 min: 67% with intermittent cues. : Recall 3 related words post 3 min: 67% with intermittent cues. 3/26: Recall 3 related words post 3 min: 67% with intermittent cues.   -AM     Other Adult Goal- 4 STG 3: Pt. will recall details from 22-36 wd. paragraphs with 80% accuracy and  intermittent cues.   -AM     Status: Other Adult Goal- 4 Discontinued  -AM     Comments: Other Adult Goal- 4   5/15: Recall details from 22-36 wd. paragraphs: 86% with intermittent cues. 5/8: Recall details from 36-50 wd. paragraphs: 43% with cues, 22-36 wd. paragraphs: 67% withintermittent cues. revised goal to 22-36 wd. paragraphs. 4/30: Recall details from 36-50 wd. paragraphs: 44% with intermittent cues.   -AM     Other Adult Goal- 5 STG 4: Pt. will complete divergent naming tasks (15 items/concrete category/1 min) with 80% accuracy and intermittent cues.   -AM     Status: Other Adult Goal- 5 Discontinued  -AM     Comments: Other Adult Goal- 5 5/8: Divergent naming: 15 items/concrete/1 min: 77% with intermittent cues. 4/9: Divergent naming: 15 items/concrete/1 min: 50% with intermittent cues. Perseverations on same items already listed. 4/3: Divergent naming: 15 items/concrete/1 min: 73% with intermittent cues. Easily distracted by personal stories. 3/13: Divergent naming: 15 items/concrete/1 min: 100% with intermittent cues.   -AM     Other Adult Goal- 6 STG 5: Pt. will complete ongoing assessment of organization, reasoning, and executive functioning.   -AM     Status: Other Adult Goal- 6 Achieved  -AM     Comments: Other Adult Goal- 6 3/13: COMPLETE and STGs added PRN.  -AM     Other Adult Goal- 7 STG 6: Pt. will complete temporal problems with pictures with 80% accuracy and intermittent cues.  -AM     Status: Other Adult Goal- 7 Discontinued  -AM     Comments: Other Adult Goal- 7 5/15: Temporal problems with pictures: 20% with intermittent cues. Required extended time and max cues. Unable to concentrate to complete 5 questions. 5/8: Temporal problems with pictures: 40% with intermittent cues, 80% with min-mod cues. 4/16: Temporal problems with pictures: 40% with intermittent cues. Required mod A and use of learning clock to work through problems. 4/9: Temporal problems (simple, verbal): 50% with cues and  clock. 4/3: Temporal Problems with pictures: 60% with intermittent cues.   -AM     Other Adult Goal- 8 STG 7: Pt. will complete planning tasks with 80% accuracy and intermittent cues.   -AM     Status: Other Adult Goal- 8 Discontinued  -AM     Comments: Other Adult Goal- 8 5/8: 10 step written sequence: 90% with intermittent cues. Cut steps apart and pt. manually organized them. 4/30: Paragraph completion with word bank: 73% with intermittent cues. 4/16: Simple planning task: 20% with intermittent cues. Required mod A to complete.   -AM       User Key  (r) = Recorded By, (t) = Taken By, (c) = Cosigned By    Initials Name Provider Type    AM Domonique Johnson CCC-SLP Speech and Language Pathologist          OP SLP Discharge Summary  Date of Discharge: 07/02/18  Reason for Discharge: patient/family request discontinuation of services, no further expectation of functional progress  Progress Toward Achieving Short/long Term Goals: goals not met within established timelines, unable to make functional progress at this time  Discharge Destination: home  Discharge Instructions: Pt. discharged from facility for >3 no shows      Time Calculation:                    Domonique Johnson CCC-SLP  7/2/2018

## 2018-07-06 ENCOUNTER — OFFICE VISIT (OUTPATIENT)
Dept: NEUROLOGY | Facility: CLINIC | Age: 68
End: 2018-07-06

## 2018-07-06 ENCOUNTER — LAB (OUTPATIENT)
Dept: LAB | Facility: HOSPITAL | Age: 68
End: 2018-07-06

## 2018-07-06 VITALS
DIASTOLIC BLOOD PRESSURE: 76 MMHG | HEIGHT: 72 IN | SYSTOLIC BLOOD PRESSURE: 138 MMHG | WEIGHT: 210 LBS | BODY MASS INDEX: 28.44 KG/M2

## 2018-07-06 DIAGNOSIS — F03.90 DEMENTIA WITHOUT BEHAVIORAL DISTURBANCE, UNSPECIFIED DEMENTIA TYPE: Primary | ICD-10-CM

## 2018-07-06 DIAGNOSIS — F03.90 DEMENTIA WITHOUT BEHAVIORAL DISTURBANCE, UNSPECIFIED DEMENTIA TYPE: ICD-10-CM

## 2018-07-06 LAB
AMMONIA BLD-SCNC: <10 UMOL/L (ref 19–60)
FOLATE SERPL-MCNC: >24 NG/ML (ref 3.2–20)
VIT B12 BLD-MCNC: >2000 PG/ML (ref 211–911)

## 2018-07-06 PROCEDURE — 36415 COLL VENOUS BLD VENIPUNCTURE: CPT | Performed by: PSYCHIATRY & NEUROLOGY

## 2018-07-06 PROCEDURE — 82746 ASSAY OF FOLIC ACID SERUM: CPT | Performed by: PSYCHIATRY & NEUROLOGY

## 2018-07-06 PROCEDURE — 82140 ASSAY OF AMMONIA: CPT

## 2018-07-06 PROCEDURE — 86592 SYPHILIS TEST NON-TREP QUAL: CPT | Performed by: PSYCHIATRY & NEUROLOGY

## 2018-07-06 PROCEDURE — 99214 OFFICE O/P EST MOD 30 MIN: CPT | Performed by: PSYCHIATRY & NEUROLOGY

## 2018-07-06 PROCEDURE — 82607 VITAMIN B-12: CPT | Performed by: PSYCHIATRY & NEUROLOGY

## 2018-07-06 RX ORDER — DONEPEZIL HYDROCHLORIDE 10 MG/1
10 TABLET, FILM COATED ORAL 2 TIMES DAILY
Qty: 60 TABLET | Refills: 6 | Status: SHIPPED | OUTPATIENT
Start: 2018-07-06 | End: 2018-07-06 | Stop reason: SDUPTHER

## 2018-07-06 RX ORDER — DONEPEZIL HYDROCHLORIDE 10 MG/1
10 TABLET, FILM COATED ORAL 2 TIMES DAILY
Qty: 60 TABLET | Refills: 6 | Status: SHIPPED | OUTPATIENT
Start: 2018-07-06

## 2018-07-06 NOTE — PROGRESS NOTES
"Mauricio Barber    Subjective     CC: dementia    History of Present Illness   Mauricio Barber returns to clinic today with a history of dementia. His family reports a history of progressive cognitive impairment since 2010. His daughters initially noted increased forgetfulness at that time. Over time he has had increasing impairments in orientation and executive function as well. His wife has been concerned about depression and PTSD, [facility] Citalopram has been helpful for depression.    He has been treated with donepezil and Namenda, which have been calming, though not necessarily helped with cognition.    Prior evaluation has included an MRI (I reviewed images from 1/10/18) which was unremarkable. CBC/CMP/TSH have been normal.     I have reviewed and confirmed the past family, social and medical history as accurate on 7/6/18.     Review of Systems   Constitutional: Negative.        Objective     /76   Ht 182.9 cm (72\")   Wt 95.3 kg (210 lb)   BMI 28.48 kg/m²      Neurologic Exam     Mental Status   Oriented to person.   Oriented to place.   Disoriented to time.   Registration: recalls 3 of 3 objects. Recall of objects at 5 minutes: 0/3. Follows 3 step commands.   Attention: normal.   Speech: speech is normal   Level of consciousness: alert  Able to name object. Able to read. Able to repeat. Able to write. Normal comprehension.     Cranial Nerves   Cranial nerves II through XII intact.     Motor Exam   Muscle bulk: normal  Overall muscle tone: normal    Strength   Strength 5/5 throughout.     Sensory Exam   Light touch normal.     Gait, Coordination, and Reflexes     Coordination   Finger to nose coordination: normal      MMSE=16      Assessment/Plan   Mauricio was seen today for dementia without behavioral disturbance, unspecified dementi.    Diagnoses and all orders for this visit:    Dementia without behavioral disturbance, unspecified dementia type  -     Folate  -     Vitamin " B12  -     RPR  -     Ammonia; Future    Other orders  -     Discontinue: donepezil (ARICEPT) 10 MG tablet; Take 1 tablet by mouth 2 (Two) Times a Day.  -     donepezil (ARICEPT) 10 MG tablet; Take 1 tablet by mouth 2 (Two) Times a Day.          Mauricio Barber returns to clinic today with a history of Dementia (possible AD). I again reviewed his current status and treatment options. After discussing potential treatment options, it was elected to continue on  memantine and increase donepezil gradually to 10 mg bid. I'll also look into any possible clinic trial options at . He will then follow up in 3 months , or sooner if needed.       I spent 30  minutes face to face with the patient and family. I   spent 17 minutes of this time counseling and discussing diagnosis, diagnostic testing, evaluation, current status, treatment options, management and clinical trials.    As part of this visit I reviewed prior lab results, reviewed radiology results, reviewed radiology images and obtained additional history from the family which is incorporated in the HPI.    Kumar Coley MD

## 2018-07-09 LAB — RPR SER QL: NORMAL

## 2018-10-22 ENCOUNTER — OFFICE VISIT (OUTPATIENT)
Dept: NEUROLOGY | Facility: CLINIC | Age: 68
End: 2018-10-22

## 2018-10-22 VITALS
DIASTOLIC BLOOD PRESSURE: 62 MMHG | BODY MASS INDEX: 28.44 KG/M2 | HEIGHT: 72 IN | SYSTOLIC BLOOD PRESSURE: 124 MMHG | WEIGHT: 210 LBS

## 2018-10-22 DIAGNOSIS — F03.90 DEMENTIA WITHOUT BEHAVIORAL DISTURBANCE, UNSPECIFIED DEMENTIA TYPE: Primary | ICD-10-CM

## 2018-10-22 PROCEDURE — 99214 OFFICE O/P EST MOD 30 MIN: CPT | Performed by: PHYSICIAN ASSISTANT

## 2018-10-22 NOTE — PROGRESS NOTES
"Subjective     Chief Complaint: memory loss      History of Present Illness   Mauricio Barber is a 68 y.o. male who returns to clinic today with a history of dementia. His family reports a history of progressive cognitive impairment since 2010. His daughters initially noted increased forgetfulness at that time. Over time he has had increasing impairments in orientation and executive function as well. His wife has been concerned about depression and PTSD, [facility] Citalopram has been helpful for depression.     Prior evaluation has included an MRI of the brain and screening bloodwork which were unremarkable. He is currently taking donepezil 5mg qAM and 10mg qHS and Namenda.    Today: Since his last visit in 7/18, he notes that his cognition may be improved.His family feels that he is essentially unchanged.        I have reviewed and confirmed the past family, social and medical history as accurate on 10/22/18.     Review of Systems   Constitutional: Negative.    HENT: Negative.    Eyes: Negative.    Respiratory: Negative.    Cardiovascular: Negative.    Gastrointestinal: Negative.    Endocrine: Negative.    Genitourinary: Negative.    Musculoskeletal: Negative.    Skin: Negative.    Allergic/Immunologic: Negative.    Neurological:        Memory loss    Hematological: Negative.    Psychiatric/Behavioral: Positive for dysphoric mood.       Objective     /62   Ht 182.9 cm (72\")   Wt 95.3 kg (210 lb)   BMI 28.48 kg/m²     General appearance today is normal.       Physical Exam   Neurological: He has normal strength. He has a normal Finger-Nose-Finger Test. Gait normal.   Psychiatric: His speech is normal.        Neurologic Exam     Mental Status   Oriented to person.   Oriented to place.   Disoriented to time. Disoriented to year, month and date. Oriented to day and season.   Registration: recalls 3 of 3 objects. Recall at 5 minutes: recalls 1 of 3 objects. Follows 3 step commands. "   Attention: decreased.   Speech: speech is normal   Level of consciousness: alert  Able to name object. Able to read. Able to repeat. Able to write. Normal comprehension.     Cranial Nerves   Cranial nerves II through XII intact.     Motor Exam   Muscle bulk: normal  Overall muscle tone: normal    Strength   Strength 5/5 throughout.     Sensory Exam   Light touch normal.     Gait, Coordination, and Reflexes     Gait  Gait: normal    Coordination   Finger to nose coordination: normal    Tremor   Resting tremor: absent        Results  MMSE=19 (16 in 7/18)       Assessment/Plan   Mauricio was seen today for dementia.    Diagnoses and all orders for this visit:    Dementia without behavioral disturbance, unspecified dementia type          Discussion/Summary   Mauricio Barber returns to clinic today with a history of Dementia (possible AD). I again reviewed his current status and treatment options. After discussing potential treatment options, it was elected to increase his donepezil to 20mg BID and continue on memantine unchanged. I also discussed cognitive rehabilitation, which he will consider in the future. Additionally, we will look into any possible clinic trial options at .He will then follow up in 6 months, or sooner if needed.   I spent 25 minutes minutes face to face with the patient and family with 20 minutes spent on discussing diagnosis, prognosis, evaluation, current status, treatment options, management and clinical trials as discussed above.       As part of this visit I obtained additional history from the family which is incorporated in the HPI.      Franny Joyner PA-C

## 2019-04-24 ENCOUNTER — OFFICE VISIT (OUTPATIENT)
Dept: NEUROLOGY | Facility: CLINIC | Age: 69
End: 2019-04-24

## 2019-04-24 VITALS
DIASTOLIC BLOOD PRESSURE: 70 MMHG | HEIGHT: 72 IN | BODY MASS INDEX: 28.44 KG/M2 | SYSTOLIC BLOOD PRESSURE: 118 MMHG | WEIGHT: 210 LBS

## 2019-04-24 DIAGNOSIS — F03.90 DEMENTIA WITHOUT BEHAVIORAL DISTURBANCE, UNSPECIFIED DEMENTIA TYPE: Primary | ICD-10-CM

## 2019-04-24 PROCEDURE — 99214 OFFICE O/P EST MOD 30 MIN: CPT | Performed by: PSYCHIATRY & NEUROLOGY

## 2019-04-24 NOTE — PROGRESS NOTES
"Subjective     Chief Complaint: memory loss      History of Present Illness   Mauricio Barber is a 69 y.o. male who returns to clinic today with a history of dementia. His family reports a history of progressive cognitive impairment since 2010. His daughters initially noted increased forgetfulness at that time. Over time he has had increasing impairments in orientation and executive function as well. His wife has been concerned about depression and PTSD, [facility] Citalopram has been helpful for depression.     Prior evaluation has included an MRI of the brain and screening bloodwork which were unremarkable. He is currently taking donepezil 5mg qAM and 10mg qHS and Namenda.    Since his last visit on 10/22/18 his wife notes a continued cognitive decline. Additionally, over the last 3 weeks his wife has noted variable weakness and shakiness with walking. He is being treated for prostate cancer at the [facility] and his hormonal treatment has been stopped as his wife felt this was causing dementia.     I have reviewed and confirmed the past family, social and medical history as accurate on 4/24/19.     Review of Systems   Constitutional: Negative.        Objective     /70   Ht 182.9 cm (72\")   Wt 95.3 kg (210 lb)   BMI 28.48 kg/m²     General appearance today is normal.       Physical Exam   Neurological: He has normal strength.   Reflex Scores:       Tricep reflexes are 2+ on the right side and 2+ on the left side.       Bicep reflexes are 2+ on the right side and 2+ on the left side.       Brachioradialis reflexes are 2+ on the right side and 2+ on the left side.       Patellar reflexes are 2+ on the right side and 2+ on the left side.       Achilles reflexes are 2+ on the right side and 2+ on the left side.  Psychiatric: His speech is normal.        Neurologic Exam     Mental Status   Oriented to person.   Disoriented to place.   Disoriented to time.   Registration: recalls 3 of 3 " objects. Recall at 5 minutes: recalls 2 of 3 objects. Follows 3 step commands.   Attention: normal.   Speech: speech is normal   Level of consciousness: alert  Able to name object. Able to read. Able to repeat. Unable to write. Normal comprehension.     Cranial Nerves   Cranial nerves II through XII intact.     Motor Exam   Muscle bulk: normal  Overall muscle tone: normal    Strength   Strength 5/5 throughout.     Gait, Coordination, and Reflexes     Gait  Gait: (slow, but normal stride length and base)    Reflexes   Right brachioradialis: 2+  Left brachioradialis: 2+  Right biceps: 2+  Left biceps: 2+  Right triceps: 2+  Left triceps: 2+  Right patellar: 2+  Left patellar: 2+  Right achilles: 2+  Left achilles: 2+        Results  MMSE=13      Assessment/Plan   Mauricio was seen today for follow-up.    Diagnoses and all orders for this visit:    Dementia without behavioral disturbance, unspecified dementia type          Discussion/Summary   Mauricio Barber returns to clinic today with a history of Dementia (possible AD). I again reviewed his current status and treatment options. At this time I don't have an explanation for his weakness, and am unconvinced of a neurologic etiology. I raised several possibilities including to decrease his donepezil to 10 mg daily, obtain a repeat head CT and screening bloodwork and/or referral to PT for deconditioning. He will then follow up in 6 months, or sooner if needed.     I spent 25 minutes face to face with the patient and family. I spent 15 minutes counseling and discussing diagnostic testing, current status, treatment options and management.    As part of this visit I obtained additional history from the family which is incorporated in the HPI.      Kumar Coley MD

## 2019-05-06 ENCOUNTER — HOSPITAL ENCOUNTER (OUTPATIENT)
Dept: CT IMAGING | Facility: HOSPITAL | Age: 69
Discharge: HOME | End: 2019-05-06
Admitting: PSYCHIATRY & NEUROLOGY

## 2019-05-06 PROCEDURE — 70450 CT HEAD/BRAIN W/O DYE: CPT

## 2019-06-26 ENCOUNTER — TELEPHONE (OUTPATIENT)
Dept: NEUROLOGY | Facility: CLINIC | Age: 69
End: 2019-06-26

## 2019-06-26 RX ORDER — TRAZODONE HYDROCHLORIDE 50 MG/1
50 TABLET ORAL EVERY 6 HOURS PRN
Qty: 50 TABLET | Refills: 6 | Status: SHIPPED | OUTPATIENT
Start: 2019-06-26

## 2019-10-24 ENCOUNTER — OFFICE VISIT (OUTPATIENT)
Dept: NEUROLOGY | Facility: CLINIC | Age: 69
End: 2019-10-24

## 2019-10-24 VITALS — WEIGHT: 215 LBS | BODY MASS INDEX: 30.78 KG/M2 | HEIGHT: 70 IN

## 2019-10-24 DIAGNOSIS — F03.90 DEMENTIA WITHOUT BEHAVIORAL DISTURBANCE, UNSPECIFIED DEMENTIA TYPE: Primary | ICD-10-CM

## 2019-10-24 PROCEDURE — 99214 OFFICE O/P EST MOD 30 MIN: CPT | Performed by: PHYSICIAN ASSISTANT

## 2019-10-24 NOTE — PROGRESS NOTES
"Subjective     Chief Complaint: memory loss      History of Present Illness   Mauricio Barber is a 69 y.o. male who returns to clinic today with a history of dementia. His family reports a history of progressive cognitive impairment since 2010. His daughters initially noted increased forgetfulness at that time. Over time he has had increasing impairments in orientation and executive function as well. His wife has been concerned about depression and PTSD, [facility] Citalopram has been helpful for depression.     Prior evaluation has included an MRI of the brain and screening bloodwork which were unremarkable. He is currently taking donepezil 5mg qAM and 10mg qHS and Namenda.    Today: Since his last visit in 4/19, he feels essentially unchanged. His family has noted a continued cognitive decline. His wife continues to note weakness and intermittent shakiness with walking as well as depression.      I have reviewed and confirmed the past family, social and medical history as accurate on 10/24/19.     Review of Systems   Constitutional: Negative.    HENT: Negative.    Eyes: Negative.    Respiratory: Negative.    Cardiovascular: Negative.    Gastrointestinal: Negative.    Endocrine: Negative.    Genitourinary: Negative.    Musculoskeletal: Negative.    Skin: Negative.    Allergic/Immunologic: Negative.    Neurological:        Memory loss    Hematological: Negative.    Psychiatric/Behavioral: Negative.        Objective     Ht 177.8 cm (70\")   Wt 97.5 kg (215 lb)   BMI 30.85 kg/m²     General appearance today is normal.       Physical Exam   Neurological: He has normal strength.   Psychiatric: His speech is normal.        Neurologic Exam     Mental Status   Oriented to person.   Oriented to place.   Disoriented to time. Oriented to month and season.   Registration: recalls 3 of 3 objects. Recall at 5 minutes: recalls 1 of 3 objects. Follows 3 step commands.   Attention: decreased.   Speech: " speech is normal   Level of consciousness: alert  Able to name object. Able to read. Able to repeat. Unable to write. Normal comprehension.     Cranial Nerves   Cranial nerves II through XII intact.     Motor Exam   Muscle bulk: normal  Overall muscle tone: normal    Strength   Strength 5/5 throughout.     Sensory Exam   Light touch normal.     Gait, Coordination, and Reflexes     Gait  Gait: (slow, but normal stride length and base)    Tremor   Resting tremor: absentRises easily from chair         Results  MMSE=17      Assessment/Plan   Mauricio was seen today for dementia.    Diagnoses and all orders for this visit:    Dementia without behavioral disturbance, unspecified dementia type (CMS/HCC)  -     Cancel: EEG Awake or Asleep Routine  -     Cancel: MRI Brain Without Contrast  -     Ambulatory Referral to Physical Therapy Evaluate and treat (balance impairment, weakness )          Discussion/Summary   Mauricio Barber returns to clinic today with a history of Dementia (possible AD). I suspect that his weakness is due to deconditioning. This was discussed in detail. I again reviewed his current status and treatment options. After discussing potential treatment options, it was elected to continue on his current medications unchanged. I have also made a referral to PT. He will then follow up in 6 months, or sooner if needed.   I spent 25 minutes face to face with the patient and family with 20 minutes spent on discussing diagnosis, prognosis, diagnostic testing, evaluation, current status, treatment options and management as discussed above.       As part of this visit I reviewed radiology results, reviewed radiology images and obtained additional history from the family which is incorporated in the HPI.      Franny Joyner PA-C

## 2019-10-29 ENCOUNTER — TREATMENT (OUTPATIENT)
Dept: PHYSICAL THERAPY | Facility: CLINIC | Age: 69
End: 2019-10-29

## 2019-10-29 DIAGNOSIS — Z74.09 IMPAIRED FUNCTIONAL MOBILITY, BALANCE, GAIT, AND ENDURANCE: Primary | ICD-10-CM

## 2019-10-29 PROCEDURE — 97162 PT EVAL MOD COMPLEX 30 MIN: CPT | Performed by: PHYSICAL THERAPIST

## 2019-10-29 NOTE — PROGRESS NOTES
Physical Therapy Initial Evaluation and Plan of Care      Patient: Mauricio Barber   : 1950  Diagnosis/ICD-10 Code:  Impaired functional mobility, balance, gait, and endurance [Z74.09]  Referring practitioner: Franny Joyner PA-C  Date of Initial Visit: 10/29/2019  Today's Date: 10/29/2019  Patient seen for 1 sessions           Subjective Questionnaire: VELAZQUEZ 47/56  TUG 14.73 sec  10 Meter 14.71 sec      Subjective Evaluation    History of Present Illness  Date of onset: 2019  Mechanism of injury: Pt and pt's wife relates that he has had L hip pain for about 1 year, which has caused balance and movement issues. They moved into a smaller house. One story with basement. He is able to go downstairs on occasion. He has a TM and stationary bike, which he used frequently until about 2 months ago. Pt denies falls. Pt has a rollator and a cane. Pt's hip hurts in sitting and standing, as well as walking. No pain laying down and sleeping. Pt has been diagnosed with Alzheimers and has short term memory deficits.       Patient Occupation: retired  Quality of life: fair    Pain  Current pain ratin  At best pain ratin  At worst pain ratin  Quality: dull ache  Relieving factors: rest, medications and heat    Diagnostic Tests  X-ray: abnormal  MRI studies: abnormal    Patient Goals  Patient goals for therapy: decreased pain, improved balance and increased strength             Objective       Palpation   Left   Tenderness of the gluteus rufus, gluteus medius and piriformis.     Tenderness     Left Hip   Tenderness in the sacroiliac joint.     Strength/Myotome Testing     Left Hip   Planes of Motion   Flexion: 4+  Extension: 4-  Abduction: 4    Right Hip   Planes of Motion   Flexion: 4+  Extension: 4-  Abduction: 4    Left Knee   Flexion: 5  Extension: 5    Right Knee   Flexion: 5  Extension: 5    Left Ankle/Foot   Dorsiflexion: 5    Right Ankle/Foot   Dorsiflexion: 5       PT Neuro         Assessment & Plan      Assessment  Impairments: abnormal coordination, abnormal gait, activity intolerance, impaired balance, impaired physical strength and safety issue  Assessment details: Patient is a 69 YOM that presents with gait, balance and strength deficits, as well as L hip musculature pain. Patient's wife states he has diagnosed arthritis in the L hip, however no reproduction of pain could be elicited with testing. His pain was identified more muscular in nature and there was obvious improvement with gait pattern after stretching/testing. He will require skilled PT intervention to address deficits in order to improve function and mobility.   Prognosis: fair  Functional Limitations: walking, sitting and standing  Goals  Plan Goals: STG (4 visits)  1. Patient will report compliance with initial HEP.   2. Patient to improve VELAZQUEZ balance score to >/= 50 /56 to decrease client's risk of falls.  3. Patient to perform TUG within 12 sec without LOB for improved functional mobility.  4. Patient to ambulate 10 meters without AD within 13 sec without LOB for improved       gait damon and functional mobility.      LTG (8 visits)  1. Patient will be I with final HEP.   2. Patient to improve VELAZQUEZ balance score to >/= 53 /56 to decrease client's risk of falls.  3. Patient to perform TUG within 10 sec without LOB for improved functional mobility.  4. Patient to ambulate 10 meters without AD within 12 sec without LOB for improved gait damon and functional mobility.        Plan  Therapy options: will be seen for skilled physical therapy services  Planned modality interventions: TENS  Planned therapy interventions: ADL retraining, balance/weight-bearing training, flexibility, gait training, manual therapy, neuromuscular re-education, motor coordination training, postural training, strengthening, stretching, therapeutic activities, transfer training and home exercise program  Frequency: 1x week  Duration in visits: 8  Treatment plan  discussed with: patient and caregiver  Plan details: Patient will be seen 1x/wk x 8wks with treatment to include strengthening, stretching, manual therapy, neuromuscular re-education, balance, gait and endurance training.         Timed:  Manual Therapy:    0     mins  01372;  Therapeutic Exercise:    0     mins  49882;     Neuromuscular Neema:    0    mins  06041;    Therapeutic Activity:     0     mins  32461;     Gait Trainin     mins  32650;     Ultrasound:     0     mins  32930;    Electrical Stimulation:    0     mins  03273 ( );    Untimed:  Electrical Stimulation:    0     mins  01635 ( );  Mechanical Traction:    0     mins  53756;     Timed Treatment:   0   mins   Total Treatment:     45   mins    PT SIGNATURE: Roxanne Luna, PT, DPT, MSCS, CDP  DATE TREATMENT INITIATED: 10/29/2019    Initial Certification Certification Period: 2020  I certify that the therapy services are furnished while this patient is under my care.  The services outlined above are required by this patient, and will be reviewed every 90 days.     PHYSICIAN: Franny Joyner PA-C      DATE:     Please sign and return via fax to [phone]..   Thank you,   [facility].

## 2019-11-05 ENCOUNTER — TREATMENT (OUTPATIENT)
Dept: PHYSICAL THERAPY | Facility: CLINIC | Age: 69
End: 2019-11-05

## 2019-11-05 DIAGNOSIS — Z74.09 IMPAIRED FUNCTIONAL MOBILITY, BALANCE, GAIT, AND ENDURANCE: Primary | ICD-10-CM

## 2019-11-05 PROCEDURE — 97110 THERAPEUTIC EXERCISES: CPT | Performed by: PHYSICAL THERAPIST

## 2019-11-05 NOTE — PROGRESS NOTES
Physical Therapy Daily Progress Note  Visit: 2  Date of Initial Visit: Type: THERAPY  Noted: 10/29/2019    Patient: Mauricio Barber   : 1950  Diagnosis/ICD-10 Code:  Impaired functional mobility, balance, gait, and endurance [Z74.09]  Referring practitioner: Franny Joyner PA-C  Date of Initial Visit: Type: THERAPY  Noted: 10/29/2019  Today's Date: 2019  Patient seen for 2 sessions      Subjective:   Patient reports: Pt's wife relates he only did HEP 2 times.   Pain: 0/10  Clinical Progress: unchanged  Home Program Compliance: Yes  Treatment has included: therapeutic exercise    Objective   See Exercise, Manual, and Modality Logs for complete treatment.    PT Neuro    Exercise 1  Exercise Name 1: Nu-Step   Equipment/Resistance 1: L6  Time: 5 min   Exercise 2  Exercise Name 2: bridges   Sets/Reps 2: 10  Exercise 3  Exercise Name 3: clamshells   Sets/Reps 3: 10 ea   Exercise 4  Exercise Name 4: SLR  Sets/Reps 4: 10 ea  Exercise 5  Exercise Name 5: SKTC   Sets/Reps 5: 3 ea   Time 5: 5 sec   Exercise 6  Exercise Name 6: piriformis stretch  Time 6: 30 sec  Exercise 7  Exercise Name 7: HS stretch   Time 7: 30 sec ea              Assessment & Plan     Assessment  Assessment details: Patient did not perform HEP this week much because he told his wife his hip hurt. Patient demonstrated improved gait post exercise and stretches. Patient was administered new stretches for home performance.     Plan  Plan details: Patient to continue with PT services to improve gait, balance, strength, transfers and overall functional mobility.        Timed:  Manual Therapy:            0     mins  43106;  Therapeutic Exercise:    30    mins  58761;     Neuromuscular Neema:    0    mins  77901;    Therapeutic Activity:      0     mins  17170;     Gait Trainin    mins  08590;     Ultrasound:                     0    mins  49335;    Electrical Stimulation:    0    mins  97846 ( );     Untimed:  Electrical  Stimulation:    0     mins  78043 ( );  Mechanical Traction:      0     mins  15236;   Canalith Repositioning techniques _[id number]      Timed Treatment:   30   mins   Total Treatment:     30   mins      Roxanne Luna PT, KAINT, MSCS, CDP  [place] License #: [id number]  Physical Therapist

## 2019-11-14 ENCOUNTER — TRANSCRIBE ORDERS (OUTPATIENT)
Dept: PHYSICAL THERAPY | Facility: CLINIC | Age: 69
End: 2019-11-14

## 2019-11-14 ENCOUNTER — TREATMENT (OUTPATIENT)
Dept: PHYSICAL THERAPY | Facility: CLINIC | Age: 69
End: 2019-11-14

## 2019-11-14 DIAGNOSIS — Z74.09 IMPAIRED FUNCTIONAL MOBILITY, BALANCE, GAIT, AND ENDURANCE: Primary | ICD-10-CM

## 2019-11-14 DIAGNOSIS — F02.80 LATE ONSET ALZHEIMER'S DISEASE WITHOUT BEHAVIORAL DISTURBANCE (HCC): Primary | ICD-10-CM

## 2019-11-14 DIAGNOSIS — G30.1 LATE ONSET ALZHEIMER'S DISEASE WITHOUT BEHAVIORAL DISTURBANCE (HCC): Primary | ICD-10-CM

## 2019-11-14 PROCEDURE — 97110 THERAPEUTIC EXERCISES: CPT | Performed by: PHYSICAL THERAPIST

## 2019-11-14 PROCEDURE — 97112 NEUROMUSCULAR REEDUCATION: CPT | Performed by: PHYSICAL THERAPIST

## 2019-11-14 NOTE — PROGRESS NOTES
"Physical Therapy Daily Progress Note  Visit: 3  Date of Initial Visit: Type: THERAPY  Noted: 10/29/2019    Mauricio Barber reports: Pt's wife reports that he has been overall working much better, but has had complaints of pain in his hip/butt area.   Subjective Evaluation    Pain  No pain reported          Objective   See Exercise, Manual, and Modality Logs for complete treatment.    Exercise 1  Exercise Name 1: (P) Nu-Step   Equipment/Resistance 1: (P) L6  Time: (P) 5 min   Exercise 2  Exercise Name 2: (P) // bars B single leg side stepping over half foam roll, progressed to B single leg side stepping over onto blue airex discs, progressed to fwd and sidestepping over half foam onto blue airex pad; min A; LOB 10% of time  Equipment/Resistance 2: (P) // bars; half foam; blue airex discs; blue airex pad; min A; LOB 10% of time  Sets/Reps 2: (P) 10x each  Exercise 3  Exercise Name 3: (P) // bars B single leg tapping cone on 4\" step; min A; LOB 10% of time  Equipment/Resistance 3: (P) // bars; 4\" step; cone; min A; LOB 10% of time  Sets/Reps 3: (P) 10 x each  Exercise 4  Exercise Name 4: (P) // bars B side/fwd stepping over 3 half foam rolls; min A  Equipment/Resistance 4: (P) // bars; 3 half foam rollsl; min A  Sets/Reps 4: (P) 10 x each   Exercise 5  Exercise Name 5: (P) Seated B hamstring stretch   Time 5: (P) 2 x 30 sec each  Exercise 6  Exercise Name 6: (P) Seated piriformis (figure 4) stretch   Time 6: (P) 2 x 30 sec      PT Neuro          Assessment & Plan     Assessment  Assessment details: Pt requires constant VCing in order to coordinate the proper technique and perform the exercise properly. Pt is able to perform exercises properly but requires extended period of time in order to accomplish. Pt has increased tightness of the B hips muscles, affecting pt's gait, as he walks with stiff LEs and decreased arm swing.     Plan  Plan details: Pt to continue to benefit from PT services with focus on improving strength " of the LEs, static/dynamic balance, and functional mobility. Pt to be referred to speech therapy. Focus to also be made on improving flexibility of LEs and improving gait pattern.         Manual Therapy:     0     mins  11913;  Therapeutic Exercise:     10     mins  87220;     Neuromuscular Neema:     35    mins  39766;    Therapeutic Activity:      0     mins  74020;     Gait Trainin     mins  78026;     Ultrasound:      0     mins  37575;    Electrical Stimulation:     0     mins  71879 ( );  Dry Needling      0     mins self-pay  Traction      0     mins 46970  Canalith Repositioning    0     mins 67418      Timed Treatment:   45   mins   Total Treatment:     45   mins    Roxanne Luna, PT  [place] License #: [id number]    Physical Therapist

## 2019-11-21 ENCOUNTER — TREATMENT (OUTPATIENT)
Dept: PHYSICAL THERAPY | Facility: CLINIC | Age: 69
End: 2019-11-21

## 2019-11-21 DIAGNOSIS — Z74.09 IMPAIRED FUNCTIONAL MOBILITY, BALANCE, GAIT, AND ENDURANCE: Primary | ICD-10-CM

## 2019-11-21 PROCEDURE — 97110 THERAPEUTIC EXERCISES: CPT | Performed by: PHYSICAL THERAPIST

## 2019-11-21 PROCEDURE — 97112 NEUROMUSCULAR REEDUCATION: CPT | Performed by: PHYSICAL THERAPIST

## 2019-11-22 NOTE — PROGRESS NOTES
Physical Therapy Daily Progress Note  Visit: 4  Date of Initial Visit: Type: THERAPY  Noted: 10/29/2019    Patient: Mauricio Barber   : 1950  Diagnosis/ICD-10 Code:  Impaired functional mobility, balance, gait, and endurance [Z74.09]  Referring practitioner: Franny Joyner PA-C  Date of Initial Visit: Type: THERAPY  Noted: 10/29/2019  Today's Date: 2019  Patient seen for 4 sessions      Subjective:   Patient reports: Patient's wife reports he wasn't as good doing his HEP this past week.   Pain: 0/10  Clinical Progress: improved  Home Program Compliance: Yes  Treatment has included: therapeutic exercise and neuromuscular re-education    Objective   See Exercise, Manual, and Modality Logs for complete treatment.    PT Neuro   Exercise 1  Exercise Name 1: Nu-Step   Equipment/Resistance 1: L6  Time: 8 min   Exercise 2  Exercise Name 2: TG squats   Sets/Reps 2: 2 sets  Time 2: 2 min   Exercise 3  Exercise Name 3: straddle stepping in // bars   Exercise 4  Exercise Name 4: step up onto step with forward cone tap  Exercise 5  Exercise Name 5: lateral stepping in // bars with RTB   Sets/Reps 5: 6 laps   Exercise 6  Exercise Name 6: backwards stepping in // bars with RTB   Sets/Reps 6: 6 laps                    Assessment & Plan     Assessment  Assessment details: Patient demonstrates improved gait post session. He is having carry over to home environment which was his wifes main goal. Continue with treatment plan.     Plan  Plan details: Patient to continue with PT services to improve gait, balance, strength, transfers and overall functional mobility.          Timed:  Manual Therapy:            0     mins  43051;  Therapeutic Exercise:    15    mins  07587;     Neuromuscular Neema:    30    mins  30926;    Therapeutic Activity:      0     mins  75354;     Gait Trainin    mins  57867;     Ultrasound:                     0    mins  01855;    Electrical Stimulation:    0    mins  93259 (  );     Untimed:  Electrical Stimulation:    0     mins  52810 ( );  Mechanical Traction:      0     mins  25224;   Canalith Repositioning techniques _[id number]      Timed Treatment:   45   mins   Total Treatment:     45   mins      Roxanne Luna PT, DPT, MSCS, CDP  [place] License #: [id number]  Physical Therapist

## 2019-12-04 ENCOUNTER — OFFICE VISIT (OUTPATIENT)
Dept: PHYSICAL THERAPY | Facility: CLINIC | Age: 69
End: 2019-12-04

## 2019-12-04 ENCOUNTER — TREATMENT (OUTPATIENT)
Dept: PHYSICAL THERAPY | Facility: CLINIC | Age: 69
End: 2019-12-04

## 2019-12-04 DIAGNOSIS — G30.1 DEMENTIA OF THE ALZHEIMER'S TYPE WITH LATE ONSET WITHOUT BEHAVIORAL DISTURBANCE (HCC): Primary | ICD-10-CM

## 2019-12-04 DIAGNOSIS — F02.80 DEMENTIA OF THE ALZHEIMER'S TYPE WITH LATE ONSET WITHOUT BEHAVIORAL DISTURBANCE (HCC): Primary | ICD-10-CM

## 2019-12-04 DIAGNOSIS — Z74.09 IMPAIRED FUNCTIONAL MOBILITY, BALANCE, GAIT, AND ENDURANCE: Primary | ICD-10-CM

## 2019-12-04 DIAGNOSIS — R41.841 COGNITIVE COMMUNICATION DEFICIT: ICD-10-CM

## 2019-12-04 PROCEDURE — 97110 THERAPEUTIC EXERCISES: CPT | Performed by: PHYSICAL THERAPIST

## 2019-12-04 PROCEDURE — 97112 NEUROMUSCULAR REEDUCATION: CPT | Performed by: PHYSICAL THERAPIST

## 2019-12-04 PROCEDURE — 92523 SPEECH SOUND LANG COMPREHEN: CPT | Performed by: SPEECH-LANGUAGE PATHOLOGIST

## 2019-12-04 NOTE — PROGRESS NOTES
Outpatient Speech Language Pathology   Adult Speech Language Cognitive Initial Evaluation       Patient Name: Mauricio Barber  : 1950  MRN: [id number]  Today's Date: 2019        Visit Date: 2019   Patient Active Problem List   Diagnosis   • LOM (left otitis media)   • Hypertension   • Chest pain   • Hyperlipidemia   • Abdominal pain   • Unspecified dementia without behavioral disturbance (CMS/HCC)   • Angina at rest (CMS/HCC)   • Amnesia   • Lung nodule   • Mild renal insufficiency   • Diarrhea of presumed infectious origin        Past Medical History:   Diagnosis Date   • Alzheimer disease (CMS/HCC)    • Cancer (CMS/HCC)    • Colon polyp    • Depression    • High cholesterol    • Hyperlipidemia    • Hypertension    • Prostate cancer (CMS/HCC)    • Prostate cancer (CMS/HCC)         Past Surgical History:   Procedure Laterality Date   • PROSTATE SURGERY     • PROSTATE SURGERY           Visit Dx:    ICD-10-CM ICD-9-CM   1. Dementia of the Alzheimer's type with late onset without behavioral disturbance (CMS/HCC) G30.1 331.0    F02.80 294.10   2. Cognitive communication deficit R41.841 799.52           SLP SLC Evaluation - 19 1400        Communication Assessment/Intervention    Document Type  evaluation   -LIDYA    Subjective Information  no complaints   -LIDYA    Patient Observations  alert;cooperative;agree to therapy   -LIDYA    Patient/Family Observations  pt wife present for evaluation, very supportive   -LIDYA    Patient Effort  good   -LIDYA    Symptoms Noted During/After Treatment  none   -LIDYA       General Information    Patient Profile Reviewed  yes   -LIDYA    Pertinent History Of Current Problem  This 70 yo referred by neurology for cognitive evaluation. pt with dx of AD; pmhx includes: prostate cancer with radiation reported approx 7 years ago with cognitive decline noted after this. recently getting worse per wife. Pt also struggles wtih PTSD and depression. Pt was seen for cognitive therapy in 2018  for memory deficits.   -LIDYA    Precautions/Limitations, Vision  WFL;for purposes of eval   -LIDYA    Precautions/Limitations, Hearing  WFL;for purposes of eval   -LIDYA    Patient Level of Education  High school with some business college   -LIDYA    Prior Level of Function-Communication  WFL   -LIDYA    Plans/Goals Discussed with  patient;spouse/S.O.;agreed upon   -LIDYA    Barriers to Rehab  cognitive status   -LIDYA    Patient's Goals for Discharge  patient did not state   -LIDYA    Family Goals for Discharge  functional cognition   -LIDYA       Pain Assessment    Additional Documentation  Pain Scale: FACES Pre/Post-Treatment (Group)   -LIDYA       Pain Scale: FACES Pre/Post-Treatment    Pain: FACES Scale, Pretreatment  0-->no hurt   -LIDYA    Pain: FACES Scale, Post-Treatment  0-->no hurt   -LIDYA       Comprehension Assessment/Intervention    Comprehension Assessment/Intervention  Auditory Comprehension   -LIDYA       Auditory Comprehension Assessment/Intervention    Auditory Comprehension (Communication)  moderate impairment   -LIDYA    Able to Identify Objects/Pictures (Communication)  familiar objects;mild impairment;moderate impairment   -LIDYA    Answers Questions (Communication)  yes/no;WFL;mild impairment;personal;simple;concrete;mulit-unit;moderate impairment   -LIDYA    Able to Follow Commands (Communication)  1-step;mild impairment;2-step;moderate impairment;severe impairment   -LIDYA    Narrative Discourse  simple paragraph level;mild impairment;complex paragraph level;moderate impairment;severe impairment   -LIDYA    Successful Auditory Strategies (Communication)  repetition;visual cues;decrease environmental distractions   -LIDYA       Expression Assessment/Intervention    Expression Assessment/Intervention  verbal expression;graphic expression   -LIDYA       Verbal Expression Assessment/Intervention    Verbal Expression  mild impairment   -LIDYA    Automatic Speech (Communication)  response to greeting;WFL;alphabet;mild impairment   -LIDYA    Repetition   words;phrases;WFL;sentences;mild impairment;moderate impairment   -LIDYA    Phrase Completion  automatic/predictable;WFL   -LIDYA    Responsive Naming  simple;WFL   -LIDYA    Spontaneous/Functional Words  simple;L   -LIDYA    Sentence Formulation  simple;WFL;complex;mild impairment;moderate impairment   -LIDYA    Conversational Discourse/Fluency  mild impairment   -LIDYA       Graphic Expression Assessment/Intervention    Graphic Expression  severe impairment   -LIDYA    Graphic Expression to Dictation  shapes;letters;mild impairment;moderate impairment   -LIDYA    Biographical Information  name;severe impairment   -LIDYA    Functional Correspondence  severe impairment   -LIDYA    Graphic Expression, Comment  pt unable to write legibly without cues and single letter dictation. inconsistent performance even with dictation.    -LIDYA       Oral Motor Structure and Function    Oral Motor Structure and Function  L   -LIDYA       Oral Musculature and Cranial Nerve Assessment    Oral Motor General Assessment  generalized oral motor weakness   -LIDYA       Motor Speech Assessment/Intervention    Motor Speech Function  L   -LIDYA       Cognitive Assessment Intervention- SLP    Cognitive Function (Cognition)  severe impairment   -LIDYA    Orientation Status (Cognition)  person;place;time;situation;moderate impairment   -LIDYA    Memory (Cognitive)  simple;functional;immediate;short-term;moderate impairment;severe impairment   -LIDYA    Attention (Cognitive)  sustained;moderate impairment   -LIDYA    Thought Organization (Cognitive)  concrete divergent;concrete convergent;moderate impairment;severe impairment   -LIDYA    Reasoning (Cognitive)  simple;moderate impairment   -LIDYA    Problem Solving (Cognitive)  simple;divergent;moderate impairment   -LIDYA    Functional Math (Cognitive)  simple;severe impairment   -LIDYA    Executive Function (Cognition)  deficit awareness;judgement;planning;home management activities;self-monitoring/correction;moderate impairment;severe impairment    -LIDYA    Pragmatics (Communication)  affect;initiation;moderate impairment   -LIDYA    Cognition, Comment  Pt exhibits decreased affect and signficant difficulty independently solving everyday problems, staying on task, judging safety situations. Graphic skills are greatly decreased- clock drawing scored 2 which is severe. Global Deterioration Scale (GDS) pt falls in Stage 4-5 category requiring assistance with all home adl activities.    -LIDYA       SLP Clinical Impressions    SLP Diagnosis  moderate severe cognitive communication deficit affecting adls and judgement for safety and indpendence.    -LIDYA    Rehab Potential/Prognosis  fair;good   -[facility] Criteria for Skilled Therapy Interventions Met  yes   -LIDYA    Functional Impact  functional impact in social situations;need frequent supervision;difficulty in expressing complex messages;restrictions in personal and social life;decreased ability to respond to situations safely;Poor Judgement;difficulty completing home management task   -LIDYA      User Key  (r) = Recorded By, (t) = Taken By, (c) = Cosigned By    Initials Name Provider Type    Lore Lanier MS CCC-SLP Speech and Language Pathologist             OP SLP Education     Row Name 12/04/19 1400       Education    Barriers to Learning  No barriers identified  -LIDYA    Education Provided  Family/caregivers expressed understanding of evaluation;Patient requires further education on strategies, risks;Family/caregivers require further education on strategies, risks  -LIDYA    Assessed  Learning needs;Learning motivation;Learning preferences;Learning readiness  -LIDYA    Learning Motivation  Strong  -LIDYA    Learning Method  Explanation;Demonstration  -LIDYA    Teaching Response  Reinforcement needed  -LIDYA    Education Comments  initiated regarding strategies and exercises.   -LIDYA      User Key  (r) = Recorded By, (t) = Taken By, (c) = Cosigned By    Initials Name Effective Dates    Lore Lanier MS CCC-SLP 08/03/18 -            SLP OP Goals     Row Name 12/04/19 1500          Goal Type Needed    Goal Type Needed  Other Adult Goals;Memory  -LIDYA        Memory Goals    Memory STG's  Patient's memory skills will be enhanced as reported by caregiver by using a memory book developed by SLP and family to help the patient recall important personal information.;Patient will demonstrate improved ability to recall information by stating activities patient has completed that day;Patient’s memory skills will be enhanced as reported by patient by using external memory aides  -LIDYA     Patient’s memory skills will be enhanced as reported by patient by using external memory aides  90%:;with intermittent cues  -LIDYA     Status: Patient’s memory skills will be enhanced as reported by patient by using external memory aides  New  -LIDYA     Patient's memory skills will be enhanced as reported by caregiver by using a memory book developed by SLP and family to help the patient recall important personal information.  90%:  -LIDYA     Status: Patient's memory skills will be enhanced as reported by caregiver by using a memory book developed by SLP and family to help the patient recall important personal information.  New  -LIDYA     Patient will demonstrate improved ability to recall information by stating activities patient has completed that day  90%:;with intermittent cues  -LIDYA     Status: Patient will demonstrate improved ability to recall information by stating activities patient has completed that day  New  -LIDYA        Other Goals    Other Adult Goal- 1  LTG: Pt will demonstrate improved cognitive communication skills with the use of compensatory strategies given cues as needed   -LIDYA     Status: Other Adult Goal- 1  New  -LIDYA     Other Adult Goal- 2  Pt will perform divergent naming tasks for concrete categories 80% correct for 6 items in a category.   -LIDYA     Status: Other Adult Goal- 2  New  -LIDYA     Other Adult Goal- 3  Pt will complete sequencing tasks of daily  living activities with 90% accuracy given min cues.   -LIDYA     Status: Other Adult Goal- 3  New  -LIDYA     Other Adult Goal- 4  Pt will complete cognitive testing with slp and goals added as needed.   -LIDYA     Status: Other Adult Goal- 4  New  -LIDYA        SLP Time Calculation    SLP Goal Re-Cert Due Date  03/02/20  -LIDYA       User Key  (r) = Recorded By, (t) = Taken By, (c) = Cosigned By    Initials Name Provider Type    Lore Lanier, MS CCC-SLP Speech and Language Pathologist          OP SLP Assessment/Plan - 12/04/19 1400        SLP Assessment    Functional Problems  Speech Language- Adult/Cognition   -LIDYA    Impact on Function: Adult Speech Language/Cognition  Restrictions in personal and social life;Difficulty sequencing thoughts to express complex messages;Difficulty following directions;Difficulty sequencing or problem solving to complete ADLs;Lack of insight or awareness of deficits, safety issues;Difficulty participating in avocational activities;Decreased recall of personal information and medical history;Trouble learning or remembering new information;Poor attention to task;Poor judgment;Requires supervision   -LIDYA    Clinical Impression: Speech Language-Adult/Congnition  Severe:;Cognitive Communication Impairment   -LIDYA    Functional Problems Comment  Pt requires assistance with most tasks of daily living. safety and indpenedence are greatly affected.    -LIDYA    Clinical Impression Comments  pt was stimulable for strategies as well as spouse for cueing   -LIDYA    Please refer to paper survey for additional self-reported information  Yes   -LIDYA    Please refer to items scanned into chart for additional diagnostic informaiton and handouts as provided by clinician  Yes   -LIDYA    SLP Diagnosis  severe cognitive communication deficit   -LIDYA    Prognosis  Good (comment)   -LIDYA    Patient/caregiver participated in establishment of treatment plan and goals  Yes   -LIDYA    Patient would benefit from skilled therapy  intervention  Yes   -LIDYA       SLP Plan    Frequency  1-2x weekly   -LIDYA    Duration  10 weeks   -LIDYA    Planned CPT's?  SLP INDIVIDUAL SPEECH THERAPY: 02348   -LIDYA    Expected Duration Therapy Session - minutes  45-60 minutes   -LIDYA    Plan Comments  initiate plan of care as stated completing cognitive testing    -LIDYA      User Key  (r) = Recorded By, (t) = Taken By, (c) = Cosigned By    Initials Name Provider Type    Lore Lanier, MS CCC-SLP Speech and Language Pathologist              Lore Appiah MS CCC-SLP  12/4/2019

## 2019-12-04 NOTE — PROGRESS NOTES
PT Re-Assessment / Re-Certification        Patient: Mauricio Barber   : 1950  Diagnosis/ICD-10 Code:  Impaired functional mobility, balance, gait, and endurance [Z74.09]  Referring practitioner: Franny Joyner PA-C  Date of Initial Visit: Type: THERAPY  Noted: 10/29/2019  Today's Date: 2019  Patient seen for 5 sessions      Subjective:   Patient reports: Pt's wife reports he is doing much better at home. He is doing his exercises and feels like he is improving. He continues to state he has no pain.   Pain: 0/10  Subjective Questionnaire: VELAZQUEZ 49/56  TUG 13.25 sec  10 Meter 15.97 sec  Clinical Progress: improved  Home Program Compliance: Yes  Treatment has included: therapeutic exercise and neuromuscular re-education    Subjective   Objective     PT Neuro       Assessment & Plan     Assessment  Impairments: abnormal coordination, abnormal gait, activity intolerance, impaired balance, impaired physical strength and safety issue  Prognosis: fair  Functional Limitations: walking and standing  Goals  Plan Goals: STG (4 visits)  1. Patient will report compliance with initial HEP. - met  2. Patient to improve VELAZQUEZ balance score to >/= 50 /56 to decrease client's risk of falls. - ongoing   3. Patient to perform TUG within 12 sec without LOB for improved functional mobility. - Ongoing   4. Patient to ambulate 10 meters without AD within 13 sec without LOB for improved gait damon and functional mobility. - ongoing       LTG (8 visits)  1. Patient will be I with final HEP. - Ongoing   2. Patient to improve VELAZQUEZ balance score to >/= 53 /56 to decrease client's risk of falls. - ongoing   3. Patient to perform TUG within 10 sec without LOB for improved functional mobility. - ongoing   4. Patient to ambulate 10 meters without AD within 12 sec without LOB for improved gait damon and functional mobility. - ongoing         Plan  Therapy options: will be seen for skilled physical therapy services  Frequency: 1x  week  Duration in visits: 4  Treatment plan discussed with: patient and caregiver  Plan details: Patient will continue to be seen 1x/wk x 4wks with treatment to include strengthening, stretching, manual therapy, neuromuscular re-education, balance, gait and endurance training.         Visit Diagnoses:    ICD-10-CM ICD-9-CM   1. Impaired functional mobility, balance, gait, and endurance Z74.09 V49.89       Progress toward previous goals: Partially Met      Recommendations: Continue as planned  Timeframe: 1 month  Prognosis to achieve goals: fair    PT Signature: Roxanne Luna, PT, DPT, MSCS, CDP  [place] License #: [id number]    Based upon review of the patient's progress and continued therapy plan, it is my medical opinion that Mauricio Barber should continue physical therapy treatment at [facility]  [address] [place] [id number] [phone].    Signature: __________________________________  Franny Joyner PA-C    Timed:  Manual Therapy:    0     mins  43587;  Therapeutic Exercise:    10     mins  32290;     Neuromuscular Neema:    30    mins  13216;    Therapeutic Activity:     0     mins  44928;     Gait Trainin     mins  35916;     Ultrasound:     0     mins  93718;    Electrical Stimulation:    0     mins  12334 ( );    Untimed:  Electrical Stimulation:    0     mins  78632 ( );  Mechanical Traction:    0     mins  13264;     Timed Treatment:   40   mins   Total Treatment:     40   mins

## 2019-12-11 ENCOUNTER — TREATMENT (OUTPATIENT)
Dept: PHYSICAL THERAPY | Facility: CLINIC | Age: 69
End: 2019-12-11

## 2019-12-11 ENCOUNTER — OFFICE VISIT (OUTPATIENT)
Dept: PHYSICAL THERAPY | Facility: CLINIC | Age: 69
End: 2019-12-11

## 2019-12-11 DIAGNOSIS — G30.1 DEMENTIA OF THE ALZHEIMER'S TYPE WITH LATE ONSET WITHOUT BEHAVIORAL DISTURBANCE (HCC): Primary | ICD-10-CM

## 2019-12-11 DIAGNOSIS — F02.80 DEMENTIA OF THE ALZHEIMER'S TYPE WITH LATE ONSET WITHOUT BEHAVIORAL DISTURBANCE (HCC): Primary | ICD-10-CM

## 2019-12-11 DIAGNOSIS — Z74.09 IMPAIRED FUNCTIONAL MOBILITY, BALANCE, GAIT, AND ENDURANCE: ICD-10-CM

## 2019-12-11 DIAGNOSIS — R41.841 COGNITIVE COMMUNICATION DEFICIT: ICD-10-CM

## 2019-12-11 PROCEDURE — 92507 TX SP LANG VOICE COMM INDIV: CPT | Performed by: SPEECH-LANGUAGE PATHOLOGIST

## 2019-12-11 PROCEDURE — 97110 THERAPEUTIC EXERCISES: CPT | Performed by: PHYSICAL THERAPIST

## 2019-12-11 PROCEDURE — 97112 NEUROMUSCULAR REEDUCATION: CPT | Performed by: PHYSICAL THERAPIST

## 2019-12-11 NOTE — PROGRESS NOTES
Physical Therapy Daily Progress Note  Visit: 6  Date of Initial Visit: Type: THERAPY  Noted: 10/29/2019    Patient: Mauricio Barber   : 1950  Diagnosis/ICD-10 Code:  Dementia of the Alzheimer's type with late onset without behavioral disturbance (CMS/HCC) [G30.1, F02.80]  Referring practitioner: Franny Joyner PA-C  Date of Initial Visit: Type: THERAPY  Noted: 10/29/2019  Today's Date: 2019  Patient seen for 6 sessions      Subjective:   Patient reports: he is feeling good.   Pain: 2/10 L hip   Clinical Progress: improved  Home Program Compliance: Yes  Treatment has included: therapeutic exercise and neuromuscular re-education    Objective   See Exercise, Manual, and Modality Logs for complete treatment.    PT Neuro   Exercise 1  Exercise Name 1: Nu-Step   Equipment/Resistance 1: L7  Time: 8 min  Exercise 2  Exercise Name 2: tandem walking on and off airex balance beam  Exercise 3  Exercise Name 3: forward lunge onto BOSU with 1# bar overhead press  Exercise 4  Exercise Name 4: lateral step up with cross over arm reach   Exercise 5  Exercise Name 5: seated HS stretch   Time 5: 30 sec ea   Exercise 6  Exercise Name 6: seated piriformis stretch   Time 6: 30 sec ea  Exercise 7  Exercise Name 7: calf stretch   Time 7: 30 sec       Assessment & Plan     Assessment  Assessment details: Patient demonstrating improved gait in clinic which, per wife, is carrying over to home environment. He does display a decline in balance but intact stepping strategy.     Plan  Plan details: Patient to continue with PT services to improve gait, balance, strength, transfers and overall functional mobility.          Timed:  Manual Therapy:            0     mins  23583;  Therapeutic Exercise:    30    mins  82640;     Neuromuscular Neema:    15    mins  78422;    Therapeutic Activity:      0     mins  23159;     Gait Trainin    mins  19586;     Ultrasound:                     0    mins  58537;    Electrical  Stimulation:    0    mins  12114 ( );     Untimed:  Electrical Stimulation:    0     mins  99256 ( );  Mechanical Traction:      0     mins  08830;   Canalith Repositioning techniques [id number]      Timed Treatment:   45   mins   Total Treatment:     45   mins      Roxanne Luna PT, DPT, MSCS, CDP  [place] License #: [id number]  Physical Therapist

## 2019-12-11 NOTE — PROGRESS NOTES
Outpatient Speech Language Pathology   Adult Speech Language Cognitive Treatment Note       Patient Name: Mauricio Barber  : 1950  MRN: [id number]  Today's Date: 2019         Visit Date: 2019   Patient Active Problem List   Diagnosis   • LOM (left otitis media)   • Hypertension   • Chest pain   • Hyperlipidemia   • Abdominal pain   • Unspecified dementia without behavioral disturbance (CMS/HCC)   • Angina at rest (CMS/HCC)   • Amnesia   • Lung nodule   • Mild renal insufficiency   • Diarrhea of presumed infectious origin          Visit Dx:    ICD-10-CM ICD-9-CM   1. Dementia of the Alzheimer's type with late onset without behavioral disturbance (CMS/HCC) G30.1 331.0    F02.80 294.10   2. Cognitive communication deficit R41.841 799.52           SLP OP Goals     Row Name 19 1345          Goal Type Needed    Goal Type Needed  Other Adult Goals;Memory  -LIDYA        Subjective Comments    Subjective Comments  Pt pleasantly confused wife accompanied him.   -LIDYA        Subjective Pain    Able to rate subjective pain?  yes  -LIDYA     Pre-Treatment Pain Level  0  -LIDYA     Post-Treatment Pain Level  0  -LIDYA        Memory Goals    Memory STG's  Patient's memory skills will be enhanced as reported by caregiver by using a memory book developed by SLP and family to help the patient recall important personal information.;Patient will demonstrate improved ability to recall information by stating activities patient has completed that day;Patient’s memory skills will be enhanced as reported by patient by using external memory aides  -LIDYA     Patient’s memory skills will be enhanced as reported by patient by using external memory aides  90%:;with intermittent cues  -LIDYA     Status: Patient’s memory skills will be enhanced as reported by patient by using external memory aides  Progressing as expected  -LIDYA     Comments: Patient’s memory skills will be enhanced as reported by patient by using external memory aides  :  gave memory book template to pt and wife for initiating at home. provided instruction and guidance to wife for filling in pertinent information to bring to next session.   -LIDYA     Patient's memory skills will be enhanced as reported by caregiver by using a memory book developed by SLP and family to help the patient recall important personal information.  90%:  -LIDYA     Status: Patient's memory skills will be enhanced as reported by caregiver by using a memory book developed by SLP and family to help the patient recall important personal information.  Progressing as expected  -LIDYA     Comments: Patient's memory skills will be enhanced as reported by caregiver by using a memory book developed by SLP and family to help the patient recall important personal information.  12/11: initiated external memory aids including visual cues in home.   -LIDYA     Patient will demonstrate improved ability to recall information by stating activities patient has completed that day  90%:;with intermittent cues  -LIDYA     Status: Patient will demonstrate improved ability to recall information by stating activities patient has completed that day  Progressing as expected  -LIDYA     Comments: Patient will demonstrate improved ability to recall information by stating activities patient has completed that day  12/11: pt unable to recall any activities he has done today. max cueing by wife needed with limited success.   -LIDYA        Other Goals    Other Adult Goal- 1  LTG: Pt will demonstrate improved cognitive communication skills with the use of compensatory strategies given cues as needed   -LIDYA     Status: Other Adult Goal- 1  Progressing as expected  -LIDYA     Comments: Other Adult Goal- 1  12/11: initiated.   -LIDYA     Other Adult Goal- 2  Pt will perform divergent naming tasks for concrete categories 80% correct for 6 items in a category.   -LIDYA     Status: Other Adult Goal- 2  Progressing as expected  -LIDYA     Comments: Other Adult Goal- 2  12/11:  introduced.   -LIDYA     Other Adult Goal- 3  Pt will complete sequencing tasks of daily living activities with 90% accuracy given min cues.   -LIDYA     Status: Other Adult Goal- 3  Progressing as expected  -LIDYA     Comments: Other Adult Goal- 3  12/11: max cues required to sequence 4 steps.   -LIDYA     Other Adult Goal- 4  Pt will complete cognitive testing with slp and goals added as needed.   -LIDYA     Status: Other Adult Goal- 4  Progressing as expected  -LIDYA     Comments: Other Adult Goal- 4  12/11: ongoing pt and pt wife interviewed regarding home activities and abilities.   -LIDYA     Other Adult Goal- 5  Pt will increase attention to task for adl task completion with min cues 80%  -LIDYA     Status: Other Adult Goal- 5  Progressing as expected  -LIDYA     Comments: Other Adult Goal- 5  12/11: card sorting activity required max cues and assist.   -LIDYA     Other Adult Goal- 6  --  -LIDYA     Status: Other Adult Goal- 6  --  -LIDYA     Comments: Other Adult Goal- 6  --  -LIDYA     Other Adult Goal- 7  --  -LIDYA     Status: Other Adult Goal- 7  --  -LIDYA     Comments: Other Adult Goal- 7  --  -LIDYA     Other Adult Goal- 8  --  -LIDYA     Status: Other Adult Goal- 8  --  -LIDYA     Comments: Other Adult Goal- 8  --  -LIDYA        SLP Time Calculation    SLP Goal Re-Cert Due Date  03/02/20  -LIDYA       User Key  (r) = Recorded By, (t) = Taken By, (c) = Cosigned By    Initials Name Provider Type    Lore Lanier MS CCC-SLP Speech and Language Pathologist          OP SLP Education     Row Name 12/11/19 1875       Education    Barriers to Learning  No barriers identified  -LIDYA    Education Provided  Family/caregivers demonstrated recommended strategies;Patient requires further education on strategies, risks;Family/caregivers require further education on strategies, risks  -LIDYA    Assessed  Learning needs;Learning motivation;Learning preferences;Learning readiness  -LIDYA    Learning Motivation  Strong  -LIDYA    Learning Method  Explanation;Demonstration;Teach  back;Written materials  -    Teaching Response  Reinforcement needed  -    Education Comments  gave memory journal template for home work with wife assist.   -      User Key  (r) = Recorded By, (t) = Taken By, (c) = Cosigned By    Initials Name Effective Dates    Lore Lanier MS CCC-SLP 08/03/18 -           OP SLP Assessment/Plan - 12/11/19 1345        SLP Assessment    Functional Problems  Speech Language- Adult/Cognition   -    Impact on Function: Adult Speech Language/Cognition  Restrictions in personal and social life;Difficulty sequencing thoughts to express complex messages;Difficulty following directions;Difficulty sequencing or problem solving to complete ADLs;Lack of insight or awareness of deficits, safety issues;Difficulty participating in avocational activities;Decreased recall of personal information and medical history;Trouble learning or remembering new information;Poor attention to task;Poor judgment;Requires supervision   -    Clinical Impression: Speech Language-Adult/Congnition  Severe:;Cognitive Communication Impairment   -LIDYA       SLP Plan    Frequency  1-2x weekly   -LIDYA    Duration  9 weeks   -LIDYA    Planned CPT's?  SLP INDIVIDUAL SPEECH THERAPY: 67284   -    Expected Duration Therapy Session - minutes  45-60 minutes   -    Plan Comments  continue plan of care.    -      User Key  (r) = Recorded By, (t) = Taken By, (c) = Cosigned By    Initials Name Provider Type    Lore Lanier MS CCC-SLP Speech and Language Pathologist                Lore Appiah MS CCC-SLP  12/11/2019

## 2020-01-08 ENCOUNTER — OFFICE VISIT (OUTPATIENT)
Dept: PHYSICAL THERAPY | Facility: CLINIC | Age: 70
End: 2020-01-08

## 2020-01-08 ENCOUNTER — TREATMENT (OUTPATIENT)
Dept: PHYSICAL THERAPY | Facility: CLINIC | Age: 70
End: 2020-01-08

## 2020-01-08 DIAGNOSIS — Z74.09 IMPAIRED FUNCTIONAL MOBILITY, BALANCE, GAIT, AND ENDURANCE: Primary | ICD-10-CM

## 2020-01-08 DIAGNOSIS — F02.80 DEMENTIA OF THE ALZHEIMER'S TYPE WITH LATE ONSET WITHOUT BEHAVIORAL DISTURBANCE (HCC): Primary | ICD-10-CM

## 2020-01-08 DIAGNOSIS — G30.1 DEMENTIA OF THE ALZHEIMER'S TYPE WITH LATE ONSET WITHOUT BEHAVIORAL DISTURBANCE (HCC): Primary | ICD-10-CM

## 2020-01-08 DIAGNOSIS — R41.841 COGNITIVE COMMUNICATION DEFICIT: ICD-10-CM

## 2020-01-08 PROCEDURE — 92507 TX SP LANG VOICE COMM INDIV: CPT | Performed by: SPEECH-LANGUAGE PATHOLOGIST

## 2020-01-08 PROCEDURE — 97112 NEUROMUSCULAR REEDUCATION: CPT | Performed by: PHYSICAL THERAPIST

## 2020-01-08 PROCEDURE — 97110 THERAPEUTIC EXERCISES: CPT | Performed by: PHYSICAL THERAPIST

## 2020-01-08 NOTE — PROGRESS NOTES
Outpatient Speech Language Pathology   Adult Speech Language Cognitive Progress Note       Patient Name: Mauricio Barber  : 1950  MRN: [id number]  Today's Date: 2020         Visit Date: 2020   Patient Active Problem List   Diagnosis   • LOM (left otitis media)   • Hypertension   • Chest pain   • Hyperlipidemia   • Abdominal pain   • Unspecified dementia without behavioral disturbance (CMS/HCC)   • Angina at rest (CMS/HCC)   • Amnesia   • Lung nodule   • Mild renal insufficiency   • Diarrhea of presumed infectious origin          Visit Dx:    ICD-10-CM ICD-9-CM   1. Dementia of the Alzheimer's type with late onset without behavioral disturbance (CMS/HCC) G30.1 331.0    F02.80 294.10   2. Cognitive communication deficit R41.841 799.52         SLP OP Goals     Row Name 20 1300          Goal Type Needed    Goal Type Needed  Other Adult Goals;Memory  -LIDYA        Subjective Comments    Subjective Comments  pt very cooperative pleasantly confused   -LIDYA        Subjective Pain    Able to rate subjective pain?  yes  -LIDYA     Pre-Treatment Pain Level  6  -LIDYA     Post-Treatment Pain Level  6  -LIDYA     Subjective Pain Comment  pt states that his hips and knees hurt today.   -LIDYA        Memory Goals    Memory STG's  Patient's memory skills will be enhanced as reported by caregiver by using a memory book developed by SLP and family to help the patient recall important personal information.;Patient will demonstrate improved ability to recall information by stating activities patient has completed that day;Patient’s memory skills will be enhanced as reported by patient by using external memory aides  -LIDYA     Patient’s memory skills will be enhanced as reported by patient by using external memory aides  90%:;with intermittent cues  -LIDYA     Status: Patient’s memory skills will be enhanced as reported by patient by using external memory aides  Progressing as expected  -LIDYA     Comments: Patient’s memory skills will be  enhanced as reported by patient by using external memory aides  1/8: ongoing; 12/11: gave memory book template to pt and wife for initiating at home. provided instruction and guidance to wife for filling in pertinent information to bring to next session.   -LIDYA     Patient's memory skills will be enhanced as reported by caregiver by using a memory book developed by SLP and family to help the patient recall important personal information.  90%:  -LIDYA     Status: Patient's memory skills will be enhanced as reported by caregiver by using a memory book developed by SLP and family to help the patient recall important personal information.  Progressing as expected  -LIDYA     Comments: Patient's memory skills will be enhanced as reported by caregiver by using a memory book developed by SLP and family to help the patient recall important personal information.  1/8: family has not yet started memory book with patient. 12/11: initiated external memory aids including visual cues in home.   -LIDYA     Patient will demonstrate improved ability to recall information by stating activities patient has completed that day  90%:;with intermittent cues  -LIDYA     Status: Patient will demonstrate improved ability to recall information by stating activities patient has completed that day  Progressing as expected  -LIDYA     Comments: Patient will demonstrate improved ability to recall information by stating activities patient has completed that day  1/8: pt unable to recall specific information today; perseverating about hip pain,  12/11: pt unable to recall any activities he has done today. max cueing by wife needed with limited success.   -LIDYA        Other Goals    Other Adult Goal- 1  LTG: Pt will demonstrate improved cognitive communication skills with the use of compensatory strategies given cues as needed   -LIDYA     Status: Other Adult Goal- 1  Progressing as expected  -LIDYA     Comments: Other Adult Goal- 1  1/8: continue 12/11: initiated.   -LIDYA      Other Adult Goal- 2  Pt will perform divergent naming tasks for concrete categories 80% correct for 6 items in a category.   -LIDYA     Status: Other Adult Goal- 2  Progressing as expected  -LIDYA     Comments: Other Adult Goal- 2  1/8: 2 items in concrete categories with cues 60% 12/11: introduced.   -LIDYA     Other Adult Goal- 3  Pt will complete sequencing tasks of daily living activities with 90% accuracy given min cues.   -LIDYA     Status: Other Adult Goal- 3  Progressing as expected  -LIDYA     Comments: Other Adult Goal- 3  1/8: ongoing 12/11: max cues required to sequence 4 steps.   -LIDYA     Other Adult Goal- 4  Pt will complete cognitive testing with slp and goals added as needed.   -LIDYA     Status: Other Adult Goal- 4  Achieved  -LIDYA     Comments: Other Adult Goal- 4  1/8: completed pt unable to complete standardized testing GDS score 4-5 12/11: ongoing pt and pt wife interviewed regarding home activities and abilities.   -LIDYA     Other Adult Goal- 5  Pt will increase attention to task for adl task completion with min cues 80%  -LIDYA     Status: Other Adult Goal- 5  Progressing as expected  -LIDYA     Comments: Other Adult Goal- 5  1/8: card sorting pt required mod cues and prompts to continue exercise and required repetition of directions several times during activity.  12/11: card sorting activity required max cues and assist.   -LIDYA     Other Adult Goal- 6  --  -LIDYA     Status: Other Adult Goal- 6  --  -LIDYA     Comments: Other Adult Goal- 6  --  -ILDYA     Other Adult Goal- 7  --  -LIDYA     Status: Other Adult Goal- 7  --  -LIDYA     Comments: Other Adult Goal- 7  --  -LIDYA     Other Adult Goal- 8  --  -LIDYA     Status: Other Adult Goal- 8  --  -LIDYA     Comments: Other Adult Goal- 8  --  -LIDYA        SLP Time Calculation    SLP Goal Re-Cert Due Date  03/02/20  -LIDYA       User Key  (r) = Recorded By, (t) = Taken By, (c) = Cosigned By    Initials Name Provider Type    Lore Lainer, MS CCC-SLP Speech and Language Pathologist          OP  SLP Education     Row Name 01/08/20 1300       Education    Barriers to Learning  No barriers identified  -LIDYA    Education Provided  Patient requires further education on strategies, risks;Family/caregivers require further education on strategies, risks  -LIDYA    Assessed  Learning needs;Learning motivation;Learning preferences;Learning readiness  -    Learning Motivation  Strong  -    Learning Method  Explanation;Demonstration;Teach back;Written materials  -    Teaching Response  Reinforcement needed  -    Education Comments  ongoing  -      User Key  (r) = Recorded By, (t) = Taken By, (c) = Cosigned By    Initials Name Effective Dates    Lore Lanier MS CCC-SLP 08/03/18 -           OP SLP Assessment/Plan - 01/08/20 1300        SLP Assessment    Functional Problems  Speech Language- Adult/Cognition   -    Impact on Function: Adult Speech Language/Cognition  Restrictions in personal and social life;Difficulty sequencing thoughts to express complex messages;Lack of insight or awareness of deficits, safety issues;Difficulty sequencing or problem solving to complete ADLs;Difficulty participating in avocational activities;Decreased recall of personal information and medical history;Trouble learning or remembering new information;Poor attention to task;Poor judgment;Requires supervision   -    Clinical Impression: Speech Language-Adult/Congnition  Severe:;Cognitive Communication Impairment   -LIDYA    Functional Problems Comment  pt presents wit significant memory deficits that interfere with independence and safety .    -    Clinical Impression Comments  pt and wife cooperative for therapy activities and strategies for improving safety and independence.   -    SLP Diagnosis  severe cognitive communication deficit   -    Prognosis  Fair (comment)   -    Patient/caregiver participated in establishment of treatment plan and goals  Yes   -    Patient would benefit from skilled therapy intervention   Yes   -LIDYA       SLP Plan    Frequency  1-2x weekly   -LIDYA    Duration  8 weeks   -LIDYA    Planned CPT's?  SLP INDIVIDUAL SPEECH THERAPY: 09662   -LIDYA    Expected Duration Therapy Session - minutes  30-45 minutes   -LIDYA    Plan Comments  continiue plan of care   -LIDYA      User Key  (r) = Recorded By, (t) = Taken By, (c) = Cosigned By    Initials Name Provider Type    Lore Lanier, MS CCC-SLP Speech and Language Pathologist            Lore Appiah MS CCC-SLP  1/8/2020

## 2020-01-09 NOTE — PROGRESS NOTES
PT Re-Assessment / Re-Certification        Patient: Mauricio Barber   : 1950  Diagnosis/ICD-10 Code:  Impaired functional mobility, balance, gait, and endurance [Z74.09]  Referring practitioner: Franny Joyner PA-C  Date of Initial Visit: Type: THERAPY  Noted: 10/29/2019  Today's Date: 2020  Patient seen for 7 sessions      Subjective:   Patient reports: Pt's wife states she hasnt been able to get him to do much of any exercises.   Pain: 6/10 -pre, 4/10 - post   Subjective Questionnaire: TUG 12.96 sec  10 Meter 9.41 sec  Clinical Progress: improved  Home Program Compliance: Yes  Treatment has included: therapeutic exercise and neuromuscular re-education    Subjective   Objective     PT Neuro  Exercise 1  Exercise Name 1: Nu-Step   Equipment/Resistance 1: L7  Time: 8 min   Exercise 2  Exercise Name 2: SKTC   Exercise 3  Exercise Name 3: supine piriformis stretch   Time 3: 30 sec  Exercise 4  Exercise Name 4: B HS stretch   Time 4: 30 sec ea   Exercise 5  Exercise Name 5: Re-assessment performed, discussion with wife regarding exercises        Assessment & Plan     Assessment  Impairments: abnormal coordination, abnormal gait, activity intolerance, impaired balance, impaired physical strength and safety issue  Assessment details: Although patient has not been compliant over the holidays with HEP, he has made improvements with gait speed since last re-assessment. Patient's main complaint continues with pain in his bilateral glute region, which is weak and tight musculature. Patient's wife understands HEP and will assist with re-implementing at home, as well as ensuring he is moving throughout the day and not sitting for extended periods of time.   Prognosis: fair  Functional Limitations: walking and standing  Goals  Plan Goals: STG (2 visits)  1. Patient will report compliance with initial HEP. - met  2. Patient to improve VELAZQUEZ balance score to >/= 50 /56 to decrease client's risk of falls. - ongoing   3.  Patient to perform TUG within 12 sec without LOB for improved functional mobility. - MET  4. Patient to ambulate 10 meters without AD within 13 sec without LOB for improved gait damon and functional mobility. - MET    LTG (4 visits)  1. Patient will be I with final HEP. - Ongoing   2. Patient to improve VELAZQUEZ balance score to >/= 53 /56 to decrease client's risk of falls. - ongoing   3. Patient to perform TUG within 10 sec without LOB for improved functional mobility. - ongoing   4. Patient to ambulate 10 meters without AD within 12 sec without LOB for improved gait damon and functional mobility. - MET        Plan  Therapy options: will be seen for skilled physical therapy services  Frequency: 1x week  Duration in visits: 4  Treatment plan discussed with: patient and caregiver  Plan details: Patient will continue to be seen 1x/wk x 4wks with treatment to include strengthening, stretching, manual therapy, neuromuscular re-education, balance, gait and endurance training.         Visit Diagnoses:    ICD-10-CM ICD-9-CM   1. Impaired functional mobility, balance, gait, and endurance Z74.09 V49.89       Progress toward previous goals: Partially Met      Recommendations: Continue as planned  Timeframe: 1 month  Prognosis to achieve goals: fair    PT Signature: Roxanne Luna, PT, DPT, MSCS, CDP  [place] License #: [id number]    Based upon review of the patient's progress and continued therapy plan, it is my medical opinion that Mauricio Barber should continue physical therapy treatment at [facility]  [address] [place] [id number] [phone].    Signature: __________________________________  Franny Joyner PA-C    Timed:  Manual Therapy:    0     mins  78649;  Therapeutic Exercise:    30     mins  63674;     Neuromuscular Neema:    8    mins  48838;    Therapeutic Activity:     0     mins  24941;     Gait Trainin     mins  92185;     Ultrasound:     0      mins  45246;    Electrical Stimulation:    0     mins  52901 ( );    Untimed:  Electrical Stimulation:    0     mins  27540 ( );  Mechanical Traction:    0     mins  77072;     Timed Treatment:   38   mins   Total Treatment:     38   mins

## 2020-01-15 ENCOUNTER — TREATMENT (OUTPATIENT)
Dept: PHYSICAL THERAPY | Facility: CLINIC | Age: 70
End: 2020-01-15

## 2020-01-15 ENCOUNTER — OFFICE VISIT (OUTPATIENT)
Dept: PHYSICAL THERAPY | Facility: CLINIC | Age: 70
End: 2020-01-15

## 2020-01-15 DIAGNOSIS — Z74.09 IMPAIRED FUNCTIONAL MOBILITY, BALANCE, GAIT, AND ENDURANCE: Primary | ICD-10-CM

## 2020-01-15 DIAGNOSIS — F02.80 DEMENTIA OF THE ALZHEIMER'S TYPE WITH LATE ONSET WITHOUT BEHAVIORAL DISTURBANCE (HCC): Primary | ICD-10-CM

## 2020-01-15 DIAGNOSIS — G30.1 DEMENTIA OF THE ALZHEIMER'S TYPE WITH LATE ONSET WITHOUT BEHAVIORAL DISTURBANCE (HCC): Primary | ICD-10-CM

## 2020-01-15 DIAGNOSIS — R41.841 COGNITIVE COMMUNICATION DEFICIT: ICD-10-CM

## 2020-01-15 PROCEDURE — 97110 THERAPEUTIC EXERCISES: CPT | Performed by: PHYSICAL THERAPIST

## 2020-01-15 PROCEDURE — 92507 TX SP LANG VOICE COMM INDIV: CPT | Performed by: SPEECH-LANGUAGE PATHOLOGIST

## 2020-01-15 NOTE — PROGRESS NOTES
Physical Therapy Daily Progress Note  Visit: 8  Date of Initial Visit: Type: THERAPY  Noted: 10/29/2019    Patient: Mauricio Barber   : 1950  Diagnosis/ICD-10 Code:  Impaired functional mobility, balance, gait, and endurance [Z74.09]  Referring practitioner: Franny Joyner PA-C  Date of Initial Visit: Type: THERAPY  Noted: 10/29/2019  Today's Date: 1/15/2020  Patient seen for 8 sessions      Subjective:   Patient reports: Wife states he hasnt done much this week.   Pain: 8/10 - low back and hips, 4/10 - post session  Clinical Progress: improved  Home Program Compliance: Yes  Treatment has included: therapeutic exercise    Objective   See Exercise, Manual, and Modality Logs for complete treatment.    PT Neuro   Exercise 1  Exercise Name 1: Nu-Step   Equipment/Resistance 1: L7  Time: 8 min   Exercise 2  Exercise Name 2: SKTC   Sets/Reps 2: 2  Time 2: 15 sec  Exercise 3  Exercise Name 3: supine piriformis stretch   Sets/Reps 3: 2  Time 3: 30 sec  Exercise 4  Exercise Name 4: threading   Sets/Reps 4: 2  Time 4: 15 sec  Exercise 5  Exercise Name 5: johnathan pose   Time 5: 20 sec  Exercise 6  Exercise Name 6: lateral step ups   Sets/Reps 6: 10  Exercise 7  Exercise Name 7: side stepping in // bars   Equipment/Resistance 7: YTB  Sets/Reps 7: 8 laps     Assessment & Plan     Assessment  Assessment details: Patient reports to clinic with wide MARZENA and antalgic gait due to pain in hip muscualture and low back. Stretching and exercise improved gait and pain. Patient's wife was instructed to have patient move every hour at home in order to prevent pain and poor walking habits. Respite care was also discussed with wife. Charisse Fry will be contacted regarding.     Plan  Plan details: Patient to continue with PT services to improve gait, balance, strength, transfers and overall functional mobility.          Timed:  Manual Therapy:            0     mins  97203;  Therapeutic Exercise:    40    mins  41985;      Neuromuscular Neema:    0    mins  89327;    Therapeutic Activity:      0     mins  84453;     Gait Trainin    mins  19799;     Ultrasound:                     0    mins  90542;    Electrical Stimulation:    0    mins  16597 ( );     Untimed:  Electrical Stimulation:    0     mins  80833 ( );  Mechanical Traction:      0     mins  33194;   Canalith Repositioning techniques [id number]      Timed Treatment:   40   mins   Total Treatment:     40   mins      Roxanne Luna PT, DPT, MSCS, CDP  [place] License #: [id number]  Physical Therapist

## 2020-01-15 NOTE — PROGRESS NOTES
Outpatient Speech Language Pathology   Adult Speech Language Cognitive Treatment Note       Patient Name: Mauricio Barber  : 1950  MRN: [id number]  Today's Date: 1/15/2020         Visit Date: 01/15/2020   Patient Active Problem List   Diagnosis   • LOM (left otitis media)   • Hypertension   • Chest pain   • Hyperlipidemia   • Abdominal pain   • Unspecified dementia without behavioral disturbance (CMS/HCC)   • Angina at rest (CMS/HCC)   • Amnesia   • Lung nodule   • Mild renal insufficiency   • Diarrhea of presumed infectious origin          Visit Dx:    ICD-10-CM ICD-9-CM   1. Dementia of the Alzheimer's type with late onset without behavioral disturbance (CMS/HCC) G30.1 331.0    F02.80 294.10   2. Cognitive communication deficit R41.841 799.52         SLP OP Goals     Row Name 01/15/20 1300          Goal Type Needed    Goal Type Needed  Other Adult Goals  -LIDYA        Subjective Comments    Subjective Comments  Pt cooperative for therapy activities.   -LIDYA        Subjective Pain    Able to rate subjective pain?  yes  -LIDYA     Pre-Treatment Pain Level  0  -LIDYA     Post-Treatment Pain Level  0  -LIDYA        Memory Goals    Memory STG's  Patient's memory skills will be enhanced as reported by caregiver by using a memory book developed by SLP and family to help the patient recall important personal information.;Patient will demonstrate improved ability to recall information by stating activities patient has completed that day;Patient’s memory skills will be enhanced as reported by patient by using external memory aides  -LIDYA     Patient’s memory skills will be enhanced as reported by patient by using external memory aides  90%:;with intermittent cues  -LIDYA     Status: Patient’s memory skills will be enhanced as reported by patient by using external memory aides  Progressing as expected  -LIDYA     Comments: Patient’s memory skills will be enhanced as reported by patient by using external memory aides  1/15: ongoing gave  caregiver training for activities to work on with patient daily 1/8: ongoing; 12/11: gave memory book template to pt and wife for initiating at home. provided instruction and guidance to wife for filling in pertinent information to bring to next session.   -LIDYA     Patient's memory skills will be enhanced as reported by caregiver by using a memory book developed by SLP and family to help the patient recall important personal information.  90%:  -LIDYA     Status: Patient's memory skills will be enhanced as reported by caregiver by using a memory book developed by SLP and family to help the patient recall important personal information.  Progressing as expected  -LIDYA     Comments: Patient's memory skills will be enhanced as reported by caregiver by using a memory book developed by SLP and family to help the patient recall important personal information.  1/15: family initiated; reviewed importance of book 1/8: family has not yet started memory book with patient. 12/11: initiated external memory aids including visual cues in home.   -LIDYA     Patient will demonstrate improved ability to recall information by stating activities patient has completed that day  90%:;with intermittent cues  -LIDYA     Status: Patient will demonstrate improved ability to recall information by stating activities patient has completed that day  Progressing as expected  -LIDYA     Comments: Patient will demonstrate improved ability to recall information by stating activities patient has completed that day  1/15: pt requries prompts to recall. 1/8: pt unable to recall specific information today; perseverating about hip pain,  12/11: pt unable to recall any activities he has done today. max cueing by wife needed with limited success.   -LIDYA        Other Goals    Other Adult Goal- 1  LTG: Pt will demonstrate improved cognitive communication skills with the use of compensatory strategies given cues as needed   -LIDYA     Status: Other Adult Goal- 1  Progressing  as expected  -LIDYA     Comments: Other Adult Goal- 1  1/15: visual cues are most effective with 1-3 word directives 1/8: continue 12/11: initiated.   -LIDYA     Other Adult Goal- 2  Pt will perform divergent naming tasks for concrete categories 80% correct for 6 items in a category.   -LIDYA     Status: Other Adult Goal- 2  Progressing as expected  -LIDYA     Comments: Other Adult Goal- 2  1/15: answer general info questions 60% 1/8: 2 items in concrete categories with cues 60% 12/11: introduced.   -LIDYA     Other Adult Goal- 3  Pt will complete sequencing tasks of daily living activities with 90% accuracy given min cues.   -LIDYA     Status: Other Adult Goal- 3  Progressing as expected  -LIDYA     Comments: Other Adult Goal- 3  1/15: 2 steps with visual cues 4/6 correct. 1/8: ongoing 12/11: max cues required to sequence 4 steps.   -LIDYA     Other Adult Goal- 4  Pt will complete cognitive testing with slp and goals added as needed.   -LIDYA     Status: Other Adult Goal- 4  Achieved  -LIDYA     Comments: Other Adult Goal- 4  1/8: completed pt unable to complete standardized testing GDS score 4-5 12/11: ongoing pt and pt wife interviewed regarding home activities and abilities.   -LIDYA     Other Adult Goal- 5  Pt will increase attention to task for adl task completion with min cues 80%  -LIDYA     Status: Other Adult Goal- 5  Progressing as expected  -LIDYA     Comments: Other Adult Goal- 5  1/15: matching dots on card pt required mod cues to continue task and also cues for directions after every dot placed. 1/8: card sorting pt required mod cues and prompts to continue exercise and required repetition of directions several times during activity.  12/11: card sorting activity required max cues and assist.   -LIDYA     Other Adult Goal- 6  STG 5: Pt. will complete ongoing assessment of organization, reasoning, and executive functioning.   -LIDYA     Status: Other Adult Goal- 6  Achieved  -LIDYA     Comments: Other Adult Goal- 6  3/13: COMPLETE and STGs added  PRN.  -LIDYA     Other Adult Goal- 7  STG 6: Pt. will complete temporal problems with pictures with 80% accuracy and intermittent cues.  -LIDYA     Status: Other Adult Goal- 7  Discontinued  -LIDYA     Comments: Other Adult Goal- 7  5/15: Temporal problems with pictures: 20% with intermittent cues. Required extended time and max cues. Unable to concentrate to complete 5 questions. 5/8: Temporal problems with pictures: 40% with intermittent cues, 80% with min-mod cues. 4/16: Temporal problems with pictures: 40% with intermittent cues. Required mod A and use of learning clock to work through problems. 4/9: Temporal problems (simple, verbal): 50% with cues and clock. 4/3: Temporal Problems with pictures: 60% with intermittent cues.   -LIDYA     Other Adult Goal- 8  STG 7: Pt. will complete planning tasks with 80% accuracy and intermittent cues.   -LIDYA     Status: Other Adult Goal- 8  Discontinued  -LIDYA     Comments: Other Adult Goal- 8  5/8: 10 step written sequence: 90% with intermittent cues. Cut steps apart and pt. manually organized them. 4/30: Paragraph completion with word bank: 73% with intermittent cues. 4/16: Simple planning task: 20% with intermittent cues. Required mod A to complete.   -LIDYA        SLP Time Calculation    SLP Goal Re-Cert Due Date  03/02/20  -LIDYA       User Key  (r) = Recorded By, (t) = Taken By, (c) = Cosigned By    Initials Name Provider Type    Lore Lanier, MS CCC-SLP Speech and Language Pathologist          OP SLP Education     Row Name 01/15/20 1400       Education    Barriers to Learning  No barriers identified  -LIDYA    Education Provided  Patient requires further education on strategies, risks;Family/caregivers require further education on strategies, risks  -LIDYA    Assessed  Learning needs;Learning motivation;Learning preferences;Learning readiness  -LIDYA    Learning Motivation  Strong  -LIDYA    Learning Method  Explanation;Demonstration;Written materials  -LIDYA    Teaching Response  Reinforcement  needed  -LIDYA    Education Comments  continue; gave food pyramid for cognitive wellness.   -LIDYA      User Key  (r) = Recorded By, (t) = Taken By, (c) = Cosigned By    Initials Name Effective Dates    Lore Lanier MS CCC-SLP 08/03/18 -           OP SLP Assessment/Plan - 01/15/20 1300        SLP Assessment    Functional Problems  Speech Language- Adult/Cognition   -LIDYA    Impact on Function: Adult Speech Language/Cognition  Restrictions in personal and social life;Poor attention to task;Difficulty participating in avocational activities;Decreased recall of personal information and medical history;Trouble learning or remembering new information;Poor judgment;Requires supervision   -LIDYA    Clinical Impression: Speech Language-Adult/Congnition  Severe:;Cognitive Communication Impairment   -LIDYA       SLP Plan    Frequency  1-2x weekly    -LIDYA    Duration  3 weeks   -LIDYA    Planned CPT's?  SLP INDIVIDUAL SPEECH THERAPY: 56095   -LIDYA    Expected Duration Therapy Session - minutes  30-45 minutes   -LIDYA    Plan Comments  continue plan of care; initiated pt caregiver training for carryover activities.    -LIDYA      User Key  (r) = Recorded By, (t) = Taken By, (c) = Cosigned By    Initials Name Provider Type    Lore Lanier, MS CCC-SLP Speech and Language Pathologist              Lore Appiah MS CCC-SLP  1/15/2020

## 2020-01-23 ENCOUNTER — TELEPHONE (OUTPATIENT)
Dept: NEUROLOGY | Facility: CLINIC | Age: 70
End: 2020-01-23

## 2020-01-23 NOTE — TELEPHONE ENCOUNTER
"Phone call with wife to introduce myself and assess social support needs. She and her  live in their home and have 2 involved daughter, although they have children/jobs so are limited in what they can offer. Wife is with him 24/7, this is becoming increasingly difficult as she has left for appts and doesn't have peace of mind that he can stay at home by himself. She say things are \"fine\" now but \"knows I'm reaching a breaking point\". She is concerned how he'll receive having someone staying with him or going to adult day, we discussed approaches we can try and explained that he has some awareness but his logical/reasoning abilities make it difficult for him to understand that he needs help.  Even as we were on the phone he was following her to listen in on conversation/shadowing behavior. I offered to email her approaches families have tried, what resources are in her local area, and online and in person educational programs. She understands we can talk in person as needed, too. They do not have long term care insurance, understand private pay for some services will be necessary. We will revisit that if respite is too costly. She agreed to keep in touch.  "

## 2020-01-29 ENCOUNTER — TREATMENT (OUTPATIENT)
Dept: PHYSICAL THERAPY | Facility: CLINIC | Age: 70
End: 2020-01-29

## 2020-01-29 ENCOUNTER — OFFICE VISIT (OUTPATIENT)
Dept: PHYSICAL THERAPY | Facility: CLINIC | Age: 70
End: 2020-01-29

## 2020-01-29 DIAGNOSIS — G30.1 DEMENTIA OF THE ALZHEIMER'S TYPE WITH LATE ONSET WITHOUT BEHAVIORAL DISTURBANCE (HCC): ICD-10-CM

## 2020-01-29 DIAGNOSIS — R41.841 COGNITIVE COMMUNICATION DEFICIT: Primary | ICD-10-CM

## 2020-01-29 DIAGNOSIS — F02.80 DEMENTIA OF THE ALZHEIMER'S TYPE WITH LATE ONSET WITHOUT BEHAVIORAL DISTURBANCE (HCC): ICD-10-CM

## 2020-01-29 DIAGNOSIS — Z74.09 IMPAIRED FUNCTIONAL MOBILITY, BALANCE, GAIT, AND ENDURANCE: Primary | ICD-10-CM

## 2020-01-29 PROCEDURE — 97110 THERAPEUTIC EXERCISES: CPT | Performed by: PHYSICAL THERAPIST

## 2020-01-29 PROCEDURE — 92507 TX SP LANG VOICE COMM INDIV: CPT | Performed by: SPEECH-LANGUAGE PATHOLOGIST

## 2020-01-29 NOTE — PROGRESS NOTES
"Physical Therapy Daily Progress Note  Visit: 9  Date of Initial Visit: Type: THERAPY  Noted: 10/29/2019    Patient: Mauricio Barber   : 1950  Diagnosis/ICD-10 Code:  Impaired functional mobility, balance, gait, and endurance [Z74.09]  Referring practitioner: Franny Joyner PA-C  Date of Initial Visit: Type: THERAPY  Noted: 10/29/2019  Today's Date: 2020  Patient seen for 9 sessions      Subjective:   Patient reports: Pt's wife states that they do have a [employer] membership   Pain: pt cannot verbalize \"my knees are shot\"  Clinical Progress: unchanged  Home Program Compliance: No  Treatment has included: therapeutic exercise    Objective   See Exercise, Manual, and Modality Logs for complete treatment.    PT Neuro   Exercise 1  Exercise Name 1: Nu-Step   Equipment/Resistance 1: L7  Time: 8 min   Exercise 2  Exercise Name 2: SKTC   Sets/Reps 2: 2  Time 2: 15 sec  Exercise 3  Exercise Name 3: supine piriformis stretch   Sets/Reps 3: 2  Time 3: 30 sec  Exercise 4  Exercise Name 4: threading   Sets/Reps 4: 2  Time 4: 15 sec  Exercise 5  Exercise Name 5: johnathan pose   Time 5: 20 sec  Exercise 6  Exercise Name 6: lateral step ups   Sets/Reps 6: 10  Exercise 7  Exercise Name 7: side stepping in // bars   Equipment/Resistance 7: RTB  Sets/Reps 7: 8 laps   Exercise 8  Exercise Name 8: standing hip abd   Equipment/Resistance 8: RTB  Sets/Reps 8: 15 ea     Assessment & Plan     Assessment  Assessment details: Patient continues to not be able to initiate activity at home. Pt's wife has taken steps to purchase gym membership. They have been encouraged to check classes and try walking track/machines etc in an attempt to move patient towards a wellness program.     Plan  Plan details: Patient to continue with PT services to improve gait, balance, strength, transfers and overall functional mobility.        Timed:  Manual Therapy:            0     mins  42332;  Therapeutic Exercise:    40    mins  00848;     Neuromuscular Neema: "    0    mins  45468;    Therapeutic Activity:      0     mins  94071;     Gait Trainin    mins  48551;     Electrical Stimulation:    0    mins  45575 ( );     Untimed:  Canalith Repositioning techniques _0_ 95848      Timed Treatment:   40   mins   Total Treatment:     40   mins      Roxanne Luna PT, DPT, MSCS, CDP  [place] License #: [id number]  Physical Therapist

## 2020-01-29 NOTE — PROGRESS NOTES
Outpatient Speech Language Pathology   Adult Speech Language Cognitive Treatment Note       Patient Name: Mauricio Barber  : 1950  MRN: [id number]  Today's Date: 2020         Visit Date: 2020   Patient Active Problem List   Diagnosis   • LOM (left otitis media)   • Hypertension   • Chest pain   • Hyperlipidemia   • Abdominal pain   • Unspecified dementia without behavioral disturbance (CMS/HCC)   • Angina at rest (CMS/HCC)   • Amnesia   • Lung nodule   • Mild renal insufficiency   • Diarrhea of presumed infectious origin          Visit Dx:    ICD-10-CM ICD-9-CM   1. Cognitive communication deficit R41.841 799.52   2. Dementia of the Alzheimer's type with late onset without behavioral disturbance (CMS/HCC) G30.1 331.0    F02.80 294.10           SLP OP Goals     Row Name 20 1310          Goal Type Needed    Goal Type Needed  Memory;Other Adult Goals  -LIDYA        Subjective Comments    Subjective Comments  Pt 10 min late for appt. very slow moving today. decreased verbalizations as well. cooperative for therapy activities.   -LIDYA        Subjective Pain    Able to rate subjective pain?  yes  -LIDYA     Pre-Treatment Pain Level  0  -LIDYA     Post-Treatment Pain Level  0  -LIDYA        Memory Goals    Memory STG's  Patient's memory skills will be enhanced as reported by caregiver by using a memory book developed by SLP and family to help the patient recall important personal information.;Patient will demonstrate improved ability to recall information by stating activities patient has completed that day;Patient’s memory skills will be enhanced as reported by patient by using external memory aides  -LIDYA     Patient’s memory skills will be enhanced as reported by patient by using external memory aides  90%:;with intermittent cues  -LIDYA     Status: Patient’s memory skills will be enhanced as reported by patient by using external memory aides  Progress slower than expected  -LIDYA     Comments: Patient’s memory skills  will be enhanced as reported by patient by using external memory aides  1/29: pt and wife working on memory book and using signs to prompt patient 1/15: ongoing gave caregiver training for activities to work on with patient daily 1/8: ongoing; 12/11: gave memory book template to pt and wife for initiating at home. provided instruction and guidance to wife for filling in pertinent information to bring to next session.   -LIDYA     Patient's memory skills will be enhanced as reported by caregiver by using a memory book developed by SLP and family to help the patient recall important personal information.  90%:  -LIDYA     Status: Patient's memory skills will be enhanced as reported by caregiver by using a memory book developed by SLP and family to help the patient recall important personal information.  Progressing as expected  -LIDYA     Comments: Patient's memory skills will be enhanced as reported by caregiver by using a memory book developed by SLP and family to help the patient recall important personal information.  1/29: continue 1/15: family initiated; reviewed importance of book 1/8: family has not yet started memory book with patient. 12/11: initiated external memory aids including visual cues in home.   -LIDYA     Patient will demonstrate improved ability to recall information by stating activities patient has completed that day  90%:;with intermittent cues  -LIDYA     Status: Patient will demonstrate improved ability to recall information by stating activities patient has completed that day  Progressing as expected  -LIDYA     Comments: Patient will demonstrate improved ability to recall information by stating activities patient has completed that day  1/29: decreased motivation noted1/15: pt requries prompts to recall. 1/8: pt unable to recall specific information today; perseverating about hip pain,  12/11: pt unable to recall any activities he has done today. max cueing by wife needed with limited success.   -LIDYA         Other Goals    Other Adult Goal- 1  LTG: Pt will demonstrate improved cognitive communication skills with the use of compensatory strategies given cues as needed   -LIDYA     Status: Other Adult Goal- 1  Progress slower than expected  -LIDYA     Comments: Other Adult Goal- 1  1/29 strategies require prompting 1/15: visual cues are most effective with 1-3 word directives 1/8: continue 12/11: initiated.   -LIDYA     Other Adult Goal- 2  Pt will perform divergent naming tasks for concrete categories 80% correct for 6 items in a category.   -LIDYA     Status: Other Adult Goal- 2  Progressing as expected  -LIDYA     Comments: Other Adult Goal- 2  1/29: divergent naming 5 items in each min prompting needed to get all 5. 1/15: answer general info questions 60% 1/8: 2 items in concrete categories with cues 60% 12/11: introduced.   -LIDYA     Other Adult Goal- 3  Pt will complete sequencing tasks of daily living activities with 90% accuracy given min cues.   -LIDYA     Status: Other Adult Goal- 3  Progressing as expected  -LIDYA     Comments: Other Adult Goal- 3  1/29: sequencing 4 step tasks pt unable to decide what is first etc. 1/15: 2 steps with visual cues 4/6 correct. 1/8: ongoing 12/11: max cues required to sequence 4 steps.   -LIDYA     Other Adult Goal- 4  Pt will complete cognitive testing with slp and goals added as needed.   -LIDYA     Status: Other Adult Goal- 4  Achieved  -LIDYA     Comments: Other Adult Goal- 4  1/8: completed pt unable to complete standardized testing GDS score 4-5 12/11: ongoing pt and pt wife interviewed regarding home activities and abilities.   -LIDYA     Other Adult Goal- 5  Pt will increase attention to task for adl task completion with min cues 80%  -LIDYA     Status: Other Adult Goal- 5  Progressing as expected  -LIDYA     Comments: Other Adult Goal- 5  1/29: card sorting took 5 minutes with mod cues to continue and to repeat directions 1/15: matching dots on card pt required mod cues to continue task and also cues for  directions after every dot placed. 1/8: card sorting pt required mod cues and prompts to continue exercise and required repetition of directions several times during activity.  12/11: card sorting activity required max cues and assist.   -LIDYA     Other Adult Goal- 6  STG 5: Pt. will complete ongoing assessment of organization, reasoning, and executive functioning.   -LIDYA     Status: Other Adult Goal- 6  Achieved  -LIDYA     Comments: Other Adult Goal- 6  3/13: COMPLETE and STGs added PRN.  -LIDYA     Other Adult Goal- 7  STG 6: Pt. will complete temporal problems with pictures with 80% accuracy and intermittent cues.  -LIDYA     Status: Other Adult Goal- 7  Discontinued  -LIDYA     Comments: Other Adult Goal- 7  5/15: Temporal problems with pictures: 20% with intermittent cues. Required extended time and max cues. Unable to concentrate to complete 5 questions. 5/8: Temporal problems with pictures: 40% with intermittent cues, 80% with min-mod cues. 4/16: Temporal problems with pictures: 40% with intermittent cues. Required mod A and use of learning clock to work through problems. 4/9: Temporal problems (simple, verbal): 50% with cues and clock. 4/3: Temporal Problems with pictures: 60% with intermittent cues.   -LIDYA     Other Adult Goal- 8  STG 7: Pt. will complete planning tasks with 80% accuracy and intermittent cues.   -LIDYA     Status: Other Adult Goal- 8  Discontinued  -LIDYA     Comments: Other Adult Goal- 8  5/8: 10 step written sequence: 90% with intermittent cues. Cut steps apart and pt. manually organized them. 4/30: Paragraph completion with word bank: 73% with intermittent cues. 4/16: Simple planning task: 20% with intermittent cues. Required mod A to complete.   -LIDYA        SLP Time Calculation    SLP Goal Re-Cert Due Date  03/02/20  -LIDYA       User Key  (r) = Recorded By, (t) = Taken By, (c) = Cosigned By    Initials Name Provider Type    Lore Lanier, MS CCC-SLP Speech and Language Pathologist          OP SLP  Education     Row Name 01/29/20 1310       Education    Barriers to Learning  No barriers identified  -LIDYA    Education Provided  Family/caregivers demonstrated recommended strategies;Patient requires further education on strategies, risks;Family/caregivers require further education on strategies, risks  -LIDYA    Assessed  Learning needs;Learning motivation;Learning preferences;Learning readiness  -    Learning Motivation  Moderate  -LIDYA    Learning Method  Explanation;Demonstration;Written materials  -LIDYA    Teaching Response  Reinforcement needed  -LIDYA    Education Comments  continue for carryover programming   -LIDYA      User Key  (r) = Recorded By, (t) = Taken By, (c) = Cosigned By    Initials Name Effective Dates    Lore Lanier MS CCC-SLP 08/03/18 -           OP SLP Assessment/Plan - 01/29/20 1310        SLP Assessment    Functional Problems  Speech Language- Adult/Cognition   -    Impact on Function: Adult Speech Language/Cognition  Restrictions in personal and social life;Difficulty sequencing thoughts to express complex messages;Trouble learning or remembering new information;Decreased recall of personal information and medical history;Poor attention to task;Poor judgment;Requires supervision   -LIDYA    Clinical Impression: Speech Language-Adult/Congnition  Severe:;Cognitive Communication Impairment   -LIDYA       SLP Plan    Frequency  1-2x weekly   -LIDYA    Duration  2 weeks   -LIDYA    Planned CPT's?  SLP INDIVIDUAL SPEECH THERAPY: 64068   -LIDYA    Expected Duration Therapy Session - minutes  30-45 minutes   -LIDYA    Plan Comments  continue plan of care initiate carryover programming   -      User Key  (r) = Recorded By, (t) = Taken By, (c) = Cosigned By    Initials Name Provider Type    Lore Lanier MS CCC-SLP Speech and Language Pathologist                   Lore Appiah MS CCC-SLP  1/29/2020

## 2020-02-05 ENCOUNTER — TREATMENT (OUTPATIENT)
Dept: PHYSICAL THERAPY | Facility: CLINIC | Age: 70
End: 2020-02-05

## 2020-02-05 ENCOUNTER — OFFICE VISIT (OUTPATIENT)
Dept: PHYSICAL THERAPY | Facility: CLINIC | Age: 70
End: 2020-02-05

## 2020-02-05 DIAGNOSIS — R41.841 COGNITIVE COMMUNICATION DEFICIT: Primary | ICD-10-CM

## 2020-02-05 DIAGNOSIS — Z74.09 IMPAIRED FUNCTIONAL MOBILITY, BALANCE, GAIT, AND ENDURANCE: Primary | ICD-10-CM

## 2020-02-05 DIAGNOSIS — G30.1 DEMENTIA OF THE ALZHEIMER'S TYPE WITH LATE ONSET WITHOUT BEHAVIORAL DISTURBANCE (HCC): ICD-10-CM

## 2020-02-05 DIAGNOSIS — F02.80 DEMENTIA OF THE ALZHEIMER'S TYPE WITH LATE ONSET WITHOUT BEHAVIORAL DISTURBANCE (HCC): ICD-10-CM

## 2020-02-05 PROCEDURE — 97110 THERAPEUTIC EXERCISES: CPT | Performed by: PHYSICAL THERAPIST

## 2020-02-05 PROCEDURE — 92507 TX SP LANG VOICE COMM INDIV: CPT | Performed by: SPEECH-LANGUAGE PATHOLOGIST

## 2020-02-05 NOTE — PROGRESS NOTES
Outpatient Speech Language Pathology   Adult Speech Language Cognitive Progress Note/Discharge Summary       Patient Name: Mauricio Barber  : 1950  MRN: [id number]  Today's Date: 2020         Visit Date: 2020   Patient Active Problem List   Diagnosis   • LOM (left otitis media)   • Hypertension   • Chest pain   • Hyperlipidemia   • Abdominal pain   • Unspecified dementia without behavioral disturbance (CMS/HCC)   • Angina at rest (CMS/HCC)   • Amnesia   • Lung nodule   • Mild renal insufficiency   • Diarrhea of presumed infectious origin          Visit Dx:    ICD-10-CM ICD-9-CM   1. Cognitive communication deficit R41.841 799.52   2. Dementia of the Alzheimer's type with late onset without behavioral disturbance (CMS/HCC) G30.1 331.0    F02.80 294.10                       SLP OP Goals     Row Name 20 1430          Subjective Comments    Subjective Comments  Pt and wife in session reviewed j  -LIDYA        Subjective Pain    Able to rate subjective pain?  yes  -LIDYA     Pre-Treatment Pain Level  0  -LIDYA     Post-Treatment Pain Level  0  -LIDYA        Memory Goals    Memory STG's  Patient's memory skills will be enhanced as reported by caregiver by using a memory book developed by SLP and family to help the patient recall important personal information.;Patient will demonstrate improved ability to recall information by stating activities patient has completed that day;Patient’s memory skills will be enhanced as reported by patient by using external memory aides  -LIDYA     Patient’s memory skills will be enhanced as reported by patient by using external memory aides  90%:;with intermittent cues  -LIDYA     Status: Patient’s memory skills will be enhanced as reported by patient by using external memory aides  Progress slower than expected;Discontinued  -LIDYA     Comments: Patient’s memory skills will be enhanced as reported by patient by using external memory aides  2/5:discussed external visuals for pt wife to  use for orientation with patient.  1/29: pt and wife working on memory book and using signs to prompt patient 1/15: ongoing gave caregiver training for activities to work on with patient daily 1/8: ongoing; 12/11: gave memory book template to pt and wife for initiating at home. provided instruction and guidance to wife for filling in pertinent information to bring to next session.   -LIDYA     Patient's memory skills will be enhanced as reported by caregiver by using a memory book developed by SLP and family to help the patient recall important personal information.  90%:  -LIDYA     Status: Patient's memory skills will be enhanced as reported by caregiver by using a memory book developed by SLP and family to help the patient recall important personal information.  Discontinued  -LIDYA     Comments: Patient's memory skills will be enhanced as reported by caregiver by using a memory book developed by SLP and family to help the patient recall important personal information.  2/5: pt wife has all handouts for memory book 1/29: continue 1/15: family initiated; reviewed importance of book 1/8: family has not yet started memory book with patient. 12/11: initiated external memory aids including visual cues in home.   -LIDYA     Patient will demonstrate improved ability to recall information by stating activities patient has completed that day  90%:;with intermittent cues  -LIDYA     Status: Patient will demonstrate improved ability to recall information by stating activities patient has completed that day  Discontinued  -LIDYA     Comments: Patient will demonstrate improved ability to recall information by stating activities patient has completed that day  1/29: decreased motivation noted1/15: pt requries prompts to recall. 1/8: pt unable to recall specific information today; perseverating about hip pain,  12/11: pt unable to recall any activities he has done today. max cueing by wife needed with limited success.   -LIDYA        Other Goals     Other Adult Goal- 1  LTG: Pt will demonstrate improved cognitive communication skills with the use of compensatory strategies given cues as needed   -LIDYA     Status: Other Adult Goal- 1  Progress slower than expected;Discontinued  -LIDYA     Comments: Other Adult Goal- 1  2/5: minimal progress achieved. pt lacking motivation and cognitive ability for strategies 1/29 strategies require prompting 1/15: visual cues are most effective with 1-3 word directives 1/8: continue 12/11: initiated.   -LIYDA     Other Adult Goal- 2  Pt will perform divergent naming tasks for concrete categories 80% correct for 6 items in a category.   -LIDYA     Status: Other Adult Goal- 2  Discontinued  -LIDYA     Comments: Other Adult Goal- 2  2/5: 5 items in concrete category 66% pt given home practice 1/29: divergent naming 5 items in each min prompting needed to get all 5. 1/15: answer general info questions 60% 1/8: 2 items in concrete categories with cues 60% 12/11: introduced.   -LIDYA     Other Adult Goal- 3  Pt will complete sequencing tasks of daily living activities with 90% accuracy given min cues.   -LIDYA     Status: Other Adult Goal- 3  Discontinued  -LIDYA     Comments: Other Adult Goal- 3  2/5: gave handout of suggestions for sequencing adl type tasks for patient to do at home with set up. 1/29: sequencing 4 step tasks pt unable to decide what is first etc. 1/15: 2 steps with visual cues 4/6 correct. 1/8: ongoing 12/11: max cues required to sequence 4 steps.   -LIDYA     Other Adult Goal- 4  Pt will complete cognitive testing with slp and goals added as needed.   -LIDYA     Status: Other Adult Goal- 4  Achieved  -LIDYA     Comments: Other Adult Goal- 4  1/8: completed pt unable to complete standardized testing GDS score 4-5 12/11: ongoing pt and pt wife interviewed regarding home activities and abilities.   -LIDYA     Other Adult Goal- 5  Pt will increase attention to task for adl task completion with min cues 80%  -LIDYA     Status: Other Adult Goal- 5   Discontinued  -LIDYA     Comments: Other Adult Goal- 5  2/5: attention for card sorting required prompts to continue and follow directions. gave handout of suggestions for home activities 1/29: card sorting took 5 minutes with mod cues to continue and to repeat directions 1/15: matching dots on card pt required mod cues to continue task and also cues for directions after every dot placed. 1/8: card sorting pt required mod cues and prompts to continue exercise and required repetition of directions several times during activity.  12/11: card sorting activity required max cues and assist.   -LIDYA     Other Adult Goal- 6  --  -LIDYA     Status: Other Adult Goal- 6  --  -LIDYA     Comments: Other Adult Goal- 6  --  -LIDYA     Other Adult Goal- 7  --  -LIDYA     Status: Other Adult Goal- 7  --  -LIDYA     Comments: Other Adult Goal- 7  --  -LIDYA     Other Adult Goal- 8  --  -LIDYA     Status: Other Adult Goal- 8  --  -LIDYA     Comments: Other Adult Goal- 8  --  -LIDYA       User Key  (r) = Recorded By, (t) = Taken By, (c) = Cosigned By    Initials Name Provider Type    Lore Lanier, MS CCC-SLP Speech and Language Pathologist          OP SLP Education     Row Name 02/05/20 1430       Education    Barriers to Learning  Decreased comprehension  -LIDYA    Action Taken to Address Barriers  gave handouts and training to pt wife for carryover activities.   -LIDYA    Education Provided  Family/caregivers demonstrated recommended strategies  -LIDYA    Assessed  Learning needs;Learning motivation;Learning preferences;Learning readiness  -LIDYA    Learning Motivation  Moderate  -LIDYA    Learning Method  Explanation;Demonstration;Teach back;Written materials  -LIDYA    Teaching Response  Verbalized understanding;Demonstrated understanding  -LIDYA    Education Comments  gave handouts, resources and education to pt wife for carryover activities.   -LIDYA      User Key  (r) = Recorded By, (t) = Taken By, (c) = Cosigned By    Initials Name Effective Dates    Lore Lanier, MS  CCC-SLP 08/03/18 -           OP SLP Assessment/Plan - 02/05/20 1430        SLP Assessment    Functional Problems  Speech Language- Adult/Cognition   -LIDYA    Impact on Function: Adult Speech Language/Cognition  Restrictions in personal and social life;Difficulty sequencing thoughts to express complex messages;Unable to understand written/spoken language;Difficulty following directions;Difficulty sequencing or problem solving to complete ADLs;Difficulty participating in avocational activities;Decreased recall of personal information and medical history;Trouble learning or remembering new information;Poor attention to task;Poor judgment;Requires supervision   -LIDYA    Clinical Impression: Speech Language-Adult/Congnition  Severe:;Cognitive Communication Impairment   -LIDYA    Functional Problems Comment  pt requires supervision and caregiver to prompt for adls and memory tasks    -LIDYA    Clinical Impression Comments  pt spouse given handouts and training of stategies and activities to stimulate adl participation   -LIDYA    SLP Diagnosis  severe cognitive communication deficit   -LIDYA    Prognosis  Fair (comment)   -LIDYA    Patient/caregiver participated in establishment of treatment plan and goals  Yes   -LIDYA       SLP Plan    Plan Comments  discharge skilled therapy   -LIDYA      User Key  (r) = Recorded By, (t) = Taken By, (c) = Cosigned By    Initials Name Provider Type    Lore Lanier MS CCC-SLP Speech and Language Pathologist             SLP Outcome Measures (last 72 hours)      SLP Outcome Measures     Row Name 02/05/20 1430             SLP Outcome Measures    Outcome Measure Used?  Adult NOMS  -LIDYA         Adult FCM Scores    FCM Chosen  Memory  -      Memory FCM Score  3  -LIDYA        User Key  (r) = Recorded By, (t) = Taken By, (c) = Cosigned By    Initials Name Effective Dates    Lore Lanier MS CCC-SLP 08/03/18 -                  OP SLP Discharge Summary  Date of Discharge: 02/05/20  Reason for Discharge: no  further expectation of functional progress  Progress Toward Achieving Short/long Term Goals: goals not met within established timelines, unable to make functional progress at this time  Discharge Destination: home, home w/ assist  Discharge Instructions: Pt made limited progress in therapy. However, education provided to pt wife for strategies and exercises along with resources for maximizing his cognitive independence. Handouts given for reference. Discharge skilled therapy at this time. Recommend pt follow up with MD regularly and address any changes in cognition to assure safety at home.       Lore Appiah MS CCC-SLP  2/5/2020

## 2020-02-06 NOTE — PROGRESS NOTES
PT Discharge Summary   Visit: 10  Date of Initial Visit: Type: THERAPY  Noted: 10/29/2019    Patient: Mauricio Barber   : 1950  Diagnosis/ICD-10 Code:  Impaired functional mobility, balance, gait, and endurance [Z74.09]  Referring practitioner: Franny Joyner PA-C  Date of Initial Visit: Type: THERAPY  Noted: 10/29/2019  Today's Date: 2020  Patient seen for 10 sessions      Subjective:   Patient reports: Patient's wife reports they have not been to the gym but have plans to go 2x/wk   Pain: 0/10  Clinical Progress: improved  Home Program Compliance: No  Treatment has included: therapeutic exercise    Objective   See Exercise, Manual, and Modality Logs for complete treatment.    PT Neuro  Exercise 1  Exercise Name 1: Nu-Step   Equipment/Resistance 1: L7  Time: 8 min   Exercise 2  Exercise Name 2: forward stepping onto step progressing to over step  Exercise 3  Exercise Name 3: supine piriformis stretch   Sets/Reps 3: 2  Time 3: 30 sec  Exercise 4  Exercise Name 4: calf stetch on 1/2 foam roll   Time 4: 30 sec   Exercise 5  Exercise Name 5: toe lifts from 1/2 foam roll   Time 5: 15  Exercise 6  Exercise Name 6: seated cross leg stretch   Time 6: 30 sec   Exercise 7  Exercise Name 7: side stepping in // bars   Equipment/Resistance 7: RTB  Sets/Reps 7: 8 laps     Assessment & Plan     Assessment  Assessment details: Patient's wife has obtained a gym membership that includes a walking track, pool and group fitness. Patient and his wife feel comfortable discharging to independent management of wellness program.   Prognosis: fair    Goals  Plan Goals: STG (2 visits)  1. Patient will report compliance with initial HEP. - met  2. Patient to improve VELAZQUEZ balance score to >/= 50 /56 to decrease client's risk of falls. - not met  3. Patient to perform TUG within 12 sec without LOB for improved functional mobility. - MET  4. Patient to ambulate 10 meters without AD within 13 sec without LOB for improved gait damon  and functional mobility. - MET    LTG (4 visits)  1. Patient will be I with final HEP. - not met   2. Patient to improve VELAZQUEZ balance score to >/= 53 /56 to decrease client's risk of falls. - not met   3. Patient to perform TUG within 10 sec without LOB for improved functional mobility. - not met   4. Patient to ambulate 10 meters without AD within 12 sec without LOB for improved gait damon and functional mobility. - MET        Plan  Treatment plan discussed with: patient and caregiver  Plan details: Patient will be discharged at this time to independent management of program.          Visit Diagnoses:    ICD-10-CM ICD-9-CM   1. Impaired functional mobility, balance, gait, and endurance Z74.09 V49.89       Progress toward previous goals: Partially Met      Recommendations: Discharge    PT Signature: Roxanne Luna PT, DPT, MSCS, CDP  [place] License #: [id number]    Timed:  Manual Therapy:    0     mins  99520;  Therapeutic Exercise:    45     mins  18960;     Neuromuscular Neema:    0    mins  31331;    Therapeutic Activity:     0     mins  49663;     Gait Trainin     mins  10085;     Electrical Stimulation:    0     mins  94589 ( );    Untimed:  Canalith Repositioning   0 mins  41146    Timed Treatment:   45   mins   Total Treatment:     45   mins

## 2020-05-13 ENCOUNTER — OFFICE VISIT (OUTPATIENT)
Dept: NEUROLOGY | Facility: CLINIC | Age: 70
End: 2020-05-13

## 2020-05-13 VITALS
HEIGHT: 70 IN | HEART RATE: 50 BPM | WEIGHT: 230 LBS | SYSTOLIC BLOOD PRESSURE: 140 MMHG | DIASTOLIC BLOOD PRESSURE: 70 MMHG | OXYGEN SATURATION: 90 % | BODY MASS INDEX: 32.93 KG/M2

## 2020-05-13 DIAGNOSIS — G30.1 LATE ONSET ALZHEIMER'S DISEASE WITH BEHAVIORAL DISTURBANCE (HCC): Primary | ICD-10-CM

## 2020-05-13 DIAGNOSIS — F02.818 LATE ONSET ALZHEIMER'S DISEASE WITH BEHAVIORAL DISTURBANCE (HCC): Primary | ICD-10-CM

## 2020-05-13 PROCEDURE — 99214 OFFICE O/P EST MOD 30 MIN: CPT | Performed by: PSYCHIATRY & NEUROLOGY

## 2020-05-13 NOTE — PROGRESS NOTES
"Subjective     Chief Complaint: memory loss      History of Present Illness   Mauricio Barber is a 70 y.o. male who returns to clinic today with a history of possible AD. His family reports a history of progressive cognitive impairment since 2010 when his daughters initially noted increased forgetfulness. Over time he has had increasing impairments in orientation and executive function as well. His wife has been concerned about depression and PTSD, [facility] Citalopram has been helpful for depression.     Prior evaluation has included an MRI of the brain and screening bloodwork which were unremarkable. He is currently taking donepezil 10 mg bid and Namenda.    Since his last visit on 10/24/19 his family has noted increasing impairments across all spheres of cognition. He appears apathetic. He has had infrequent possible auditory hallucinations. [facility].    I have reviewed and confirmed the past family, social and medical history as accurate on 5/13/20.     Review of Systems   Constitutional: Negative.        Objective     /70   Pulse 50   Ht 177.8 cm (70\")   Wt 104 kg (230 lb)   SpO2 90%   BMI 33.00 kg/m²     General appearance today is normal.       Physical Exam   Neurological: He has a normal Finger-Nose-Finger Test.   Psychiatric: His speech is normal.        Neurologic Exam     Mental Status   Oriented to person.   Oriented to place.   Disoriented to time.   Registration: recalls 3 of 3 objects. Recall of objects at 5 minutes: 0/3. Follows 1 step commands.   Attention: normal.   Speech: speech is normal   Level of consciousness: alert  Knowledge: poor.   Able to name object. Unable to read. Able to repeat. Able to write. Normal comprehension.     Cranial Nerves   Cranial nerves II through XII intact.     Gait, Coordination, and Reflexes     Coordination   Finger to nose coordination: normal    Tremor   Resting tremor: " absent        Results  MMSE=14      Assessment/Plan   Mauricio was seen today for follow-up.    Diagnoses and all orders for this visit:    Late onset Alzheimer's disease with behavioral disturbance (CMS/HCC)          Discussion/Summary   Mauricio Barber returns to clinic today with a history of possible AD. I again reviewed his current status and treatment, and for now it was elected to continue on his current medications unchanged. He will then follow up in 6 months, or sooner if needed.     I spent 25 minutes face to face with the patient and family. I spent 15 minutes counseling and discussing current status, treatment options and management.    As part of this visit I obtained additional history from the family which is incorporated in the HPI.      Kumar Coley MD

## 2020-05-28 ENCOUNTER — HOSPITAL ENCOUNTER (EMERGENCY)
Facility: HOSPITAL | Age: 70
Discharge: HOME | End: 2020-05-28
Attending: EMERGENCY MEDICINE | Admitting: EMERGENCY MEDICINE

## 2020-05-28 ENCOUNTER — APPOINTMENT (OUTPATIENT)
Dept: GENERAL RADIOLOGY | Facility: HOSPITAL | Age: 70
End: 2020-05-28

## 2020-05-28 VITALS
BODY MASS INDEX: 30.2 KG/M2 | SYSTOLIC BLOOD PRESSURE: 122 MMHG | HEIGHT: 72 IN | TEMPERATURE: 99.2 F | OXYGEN SATURATION: 99 % | DIASTOLIC BLOOD PRESSURE: 63 MMHG | WEIGHT: 223 LBS | HEART RATE: 60 BPM | RESPIRATION RATE: 16 BRPM

## 2020-05-28 DIAGNOSIS — N28.9 RENAL INSUFFICIENCY: ICD-10-CM

## 2020-05-28 DIAGNOSIS — L73.9 FOLLICULITIS: ICD-10-CM

## 2020-05-28 DIAGNOSIS — R60.0 LOWER LEG EDEMA: Primary | ICD-10-CM

## 2020-05-28 LAB
ALBUMIN SERPL-MCNC: 4 G/DL (ref 3.5–5.2)
ALBUMIN/GLOB SERPL: 1.6 G/DL
ALP SERPL-CCNC: 108 U/L (ref 39–117)
ALT SERPL W P-5'-P-CCNC: 14 U/L (ref 1–41)
ANION GAP SERPL CALCULATED.3IONS-SCNC: 14.3 MMOL/L (ref 5–15)
AST SERPL-CCNC: 14 U/L (ref 1–40)
BASOPHILS # BLD AUTO: 0.04 10*3/MM3 (ref 0–0.2)
BASOPHILS NFR BLD AUTO: 0.5 % (ref 0–1.5)
BILIRUB SERPL-MCNC: 0.3 MG/DL (ref 0.2–1.2)
BILIRUB UR QL STRIP: NEGATIVE
BUN BLD-MCNC: 38 MG/DL (ref 8–23)
BUN/CREAT SERPL: 18.2 (ref 7–25)
CALCIUM SPEC-SCNC: 9.1 MG/DL (ref 8.6–10.5)
CHLORIDE SERPL-SCNC: 102 MMOL/L (ref 98–107)
CLARITY UR: ABNORMAL
CO2 SERPL-SCNC: 21.7 MMOL/L (ref 22–29)
COLOR UR: YELLOW
CREAT BLD-MCNC: 2.09 MG/DL (ref 0.76–1.27)
DEPRECATED RDW RBC AUTO: 48.9 FL (ref 37–54)
EOSINOPHIL # BLD AUTO: 0.13 10*3/MM3 (ref 0–0.4)
EOSINOPHIL NFR BLD AUTO: 1.7 % (ref 0.3–6.2)
ERYTHROCYTE [DISTWIDTH] IN BLOOD BY AUTOMATED COUNT: 13.3 % (ref 12.3–15.4)
GFR SERPL CREATININE-BSD FRML MDRD: 31 ML/MIN/1.73
GLOBULIN UR ELPH-MCNC: 2.5 GM/DL
GLUCOSE BLD-MCNC: 139 MG/DL (ref 65–99)
GLUCOSE UR STRIP-MCNC: NEGATIVE MG/DL
HCT VFR BLD AUTO: 30.9 % (ref 37.5–51)
HGB BLD-MCNC: 10.1 G/DL (ref 13–17.7)
HGB UR QL STRIP.AUTO: NEGATIVE
IMM GRANULOCYTES # BLD AUTO: 0.16 10*3/MM3 (ref 0–0.05)
IMM GRANULOCYTES NFR BLD AUTO: 2.1 % (ref 0–0.5)
KETONES UR QL STRIP: ABNORMAL
LEUKOCYTE ESTERASE UR QL STRIP.AUTO: NEGATIVE
LYMPHOCYTES # BLD AUTO: 1.64 10*3/MM3 (ref 0.7–3.1)
LYMPHOCYTES NFR BLD AUTO: 21.5 % (ref 19.6–45.3)
MCH RBC QN AUTO: 32.8 PG (ref 26.6–33)
MCHC RBC AUTO-ENTMCNC: 32.7 G/DL (ref 31.5–35.7)
MCV RBC AUTO: 100.3 FL (ref 79–97)
MONOCYTES # BLD AUTO: 0.75 10*3/MM3 (ref 0.1–0.9)
MONOCYTES NFR BLD AUTO: 9.8 % (ref 5–12)
NEUTROPHILS # BLD AUTO: 4.92 10*3/MM3 (ref 1.7–7)
NEUTROPHILS NFR BLD AUTO: 64.4 % (ref 42.7–76)
NITRITE UR QL STRIP: NEGATIVE
NRBC BLD AUTO-RTO: 0 /100 WBC (ref 0–0.2)
NT-PROBNP SERPL-MCNC: 93.5 PG/ML (ref 5–900)
PH UR STRIP.AUTO: <=5 [PH] (ref 5–8)
PLATELET # BLD AUTO: 203 10*3/MM3 (ref 140–450)
PMV BLD AUTO: 9.7 FL (ref 6–12)
POTASSIUM BLD-SCNC: 4.9 MMOL/L (ref 3.5–5.2)
PROT SERPL-MCNC: 6.5 G/DL (ref 6–8.5)
PROT UR QL STRIP: NEGATIVE
RBC # BLD AUTO: 3.08 10*6/MM3 (ref 4.14–5.8)
SODIUM BLD-SCNC: 138 MMOL/L (ref 136–145)
SP GR UR STRIP: 1.02 (ref 1–1.03)
TROPONIN T SERPL-MCNC: <0.01 NG/ML (ref 0–0.03)
UROBILINOGEN UR QL STRIP: ABNORMAL
WBC NRBC COR # BLD: 7.64 10*3/MM3 (ref 3.4–10.8)

## 2020-05-28 PROCEDURE — 83880 ASSAY OF NATRIURETIC PEPTIDE: CPT | Performed by: NURSE PRACTITIONER

## 2020-05-28 PROCEDURE — 93005 ELECTROCARDIOGRAM TRACING: CPT | Performed by: NURSE PRACTITIONER

## 2020-05-28 PROCEDURE — 71046 X-RAY EXAM CHEST 2 VIEWS: CPT

## 2020-05-28 PROCEDURE — 81003 URINALYSIS AUTO W/O SCOPE: CPT | Performed by: NURSE PRACTITIONER

## 2020-05-28 PROCEDURE — 80053 COMPREHEN METABOLIC PANEL: CPT | Performed by: NURSE PRACTITIONER

## 2020-05-28 PROCEDURE — 85025 COMPLETE CBC W/AUTO DIFF WBC: CPT | Performed by: NURSE PRACTITIONER

## 2020-05-28 PROCEDURE — 99284 EMERGENCY DEPT VISIT MOD MDM: CPT

## 2020-05-28 PROCEDURE — 84484 ASSAY OF TROPONIN QUANT: CPT | Performed by: NURSE PRACTITIONER

## 2020-05-28 RX ORDER — SODIUM CHLORIDE 0.9 % (FLUSH) 0.9 %
10 SYRINGE (ML) INJECTION AS NEEDED
Status: DISCONTINUED | OUTPATIENT
Start: 2020-05-28 | End: 2020-05-28 | Stop reason: HOSPADM

## 2020-11-13 ENCOUNTER — TELEMEDICINE (OUTPATIENT)
Dept: NEUROLOGY | Facility: CLINIC | Age: 70
End: 2020-11-13

## 2020-11-13 DIAGNOSIS — G30.1 LATE ONSET ALZHEIMER'S DISEASE WITH BEHAVIORAL DISTURBANCE (HCC): Primary | ICD-10-CM

## 2020-11-13 DIAGNOSIS — F02.818 LATE ONSET ALZHEIMER'S DISEASE WITH BEHAVIORAL DISTURBANCE (HCC): Primary | ICD-10-CM

## 2020-11-13 PROCEDURE — 99443 PR PHYS/QHP TELEPHONE EVALUATION 21-30 MIN: CPT | Performed by: PHYSICIAN ASSISTANT

## 2020-11-13 NOTE — PROGRESS NOTES
Subjective       You have chosen to receive care through a telehealth visit.  Do you consent to use a video/audio connection for your medical care today? Yes      Chief Complaint: memory loss      History of Present Illness   Mauricio Barber is a 70 y.o. male who returns to clinic today with a history of possible AD. His family reports a history of progressive cognitive impairment since 2010 when his daughters initially noted increased forgetfulness. Over time he has had increasing impairments in orientation and executive function as well. His wife has been concerned about depression and PTSD, [facility] Citalopram has been helpful for depression.     Prior evaluation has included an MRI of the brain and screening bloodwork which were unremarkable. He is currently taking donepezil 10 mg bid and Namenda.    Today: Since his last visit in 5/20, he feels essentially unchanged\. His family has noted both a physical and cognitive decline.       I have reviewed and confirmed the past family, social and medical history as accurate on 11/13/2020.     Review of Systems    Objective       Physical Exam  Psychiatric:         Speech: Speech normal.          Neurologic Exam     Mental Status   Speech: speech is normal   Level of consciousness: alert  Normal comprehension.         Results  MMSE=14 (in 5/20)       Assessment/Plan   Diagnoses and all orders for this visit:    1. Late onset Alzheimer's disease with behavioral disturbance (CMS/HCC) (Primary)  -     Ambulatory Referral to Home Health          Discussion/Summary   Mauricio Barber returns to clinic today for evaluation of Alzheimer's Disease . I again reviewed his current status and treatment options. After discussing potential treatment options, it was elected to continue on his current medications unchanged. I have also made a referral to home health for gait training and cognitive rehabilitation. He will then follow up in 6 months , or sooner  if needed.   I spent 21 minutes on the phone with the patient and family with 15 minutes spent on discussing diagnosis, prognosis, evaluation, current status, treatment options and management as discussed above.     This visit was performed over the phone with patient's consent.     As part of this visit I obtained additional history from the family which is incorporated in the HPI.      Franny Joyner PA-C

## 2020-11-16 ENCOUNTER — TELEPHONE (OUTPATIENT)
Dept: NEUROLOGY | Facility: CLINIC | Age: 70
End: 2020-11-16

## 2020-11-16 NOTE — TELEPHONE ENCOUNTER
Received another email from [facility] that [facility] WILL accept the patient and see him midweek.

## 2020-11-16 NOTE — TELEPHONE ENCOUNTER
Notified by [facility] that they won't be able to accept Mr. Barber.   We will send to another agency.

## 2020-12-10 ENCOUNTER — TELEPHONE (OUTPATIENT)
Dept: NEUROLOGY | Facility: CLINIC | Age: 70
End: 2020-12-10

## 2020-12-10 DIAGNOSIS — R26.89 ABNORMALITY OF GAIT DUE TO IMPAIRMENT OF BALANCE: Primary | ICD-10-CM

## 2020-12-10 NOTE — TELEPHONE ENCOUNTER
----- Message from Niurka Acuna sent at 12/3/2020  2:33 PM EST -----  Jennifer Joyner, with [facility], Pt is being discharged today from Home Health PT, Speech Therapy is still in the Home, and once he is completely discharged she recommends a referral to Out Patient PT. Please advise

## 2021-01-21 ENCOUNTER — TELEPHONE (OUTPATIENT)
Dept: NEUROLOGY | Facility: CLINIC | Age: 71
End: 2021-01-21

## 2021-01-21 NOTE — TELEPHONE ENCOUNTER
SABRINA BANEGAS  [phone]    THE PT'S WIFE CALLED IN TODAY BECAUSE SHE IS STATING SHE IS NEEDING HER 'S MED RECORDS [facility] TO [facility]. I TRIED GETTING [employer] OF THE MED RECORD DEPT. SOMEONE ANSWERED THEN HUNG UP IMMEDIATELY. CAN THE OFFICE GET THESE RECORDS FAXED [facility] TO [facility]? THEIR FAX NUMBER [phone] AND MAKE IT TO THE ATTENTION OF DR ELYSE AMARO.

## 2021-03-15 ENCOUNTER — TELEPHONE (OUTPATIENT)
Dept: NEUROLOGY | Facility: CLINIC | Age: 71
End: 2021-03-15

## 2021-03-15 NOTE — TELEPHONE ENCOUNTER
Caller: SABRINA    Relationship: WIFE    Best call back number: [phone]    What form or medical record are you requesting: ALL [facility] MEDICAL RECORDS    Who is requesting this form or medical record from you: SABRINA    How would you like to receive the form or medical records (pick-up, mail, fax):   If fax, what is the fax number:   If mail, what is the address:   If pick-up, provide patient with address and location details    Timeframe paperwork needed: ASAP    Additional notes: PT WOULD LIKE TO PICK THEM UP IN OFFICE WHENEVER THEY ARE READY. PLEASE REVIEW AND ADVISE PT'S WIFE WHEN SHE COULD PICK THEM UP

## 2021-03-16 NOTE — TELEPHONE ENCOUNTER
Spoke to patient's wife and informed her that the records she requested can be picked up at her earliest convenience.

## 2023-11-05 NOTE — TELEPHONE ENCOUNTER
Pt needs to have a signed RAY on file and I did not see one. I left a VM for Mariely letting her know we could fax one or they could pick one up. If we don't hear back by end of day I'll be mailing one to them.     Franny Joyner also wanted to see the Pt back in May, so schedule a FU visit with them if they call back.   FAMILY HISTORY:  FH: heart disease, mother -   FH: pancreatic cancer, father -